# Patient Record
Sex: FEMALE | Race: WHITE | NOT HISPANIC OR LATINO | Employment: FULL TIME | URBAN - METROPOLITAN AREA
[De-identification: names, ages, dates, MRNs, and addresses within clinical notes are randomized per-mention and may not be internally consistent; named-entity substitution may affect disease eponyms.]

---

## 2017-02-17 ENCOUNTER — HOSPITAL ENCOUNTER (EMERGENCY)
Facility: HOSPITAL | Age: 52
Discharge: HOME | End: 2017-02-17
Attending: EMERGENCY MEDICINE | Admitting: EMERGENCY MEDICINE
Payer: COMMERCIAL

## 2017-02-17 VITALS
RESPIRATION RATE: 18 BRPM | TEMPERATURE: 99.3 F | SYSTOLIC BLOOD PRESSURE: 138 MMHG | DIASTOLIC BLOOD PRESSURE: 72 MMHG | WEIGHT: 190.48 LBS | BODY MASS INDEX: 30.61 KG/M2 | HEIGHT: 66 IN | OXYGEN SATURATION: 96 % | HEART RATE: 113 BPM

## 2017-02-17 DIAGNOSIS — J10.1 INFLUENZA A: Primary | ICD-10-CM

## 2017-02-17 LAB
ANION GAP SERPL CALCULATED.3IONS-SCNC: 10 MMOL/L (ref 4–13)
ATRIAL RATE: 70 BPM
BACTERIA UR QL AUTO: ABNORMAL /HPF
BASOPHILS # BLD AUTO: 0.05 THOUSANDS/ΜL (ref 0–0.1)
BASOPHILS NFR BLD AUTO: 1 % (ref 0–1)
BILIRUB UR QL STRIP: NEGATIVE
BUN SERPL-MCNC: 8 MG/DL (ref 5–25)
CALCIUM SERPL-MCNC: 9.3 MG/DL (ref 8.3–10.1)
CHLORIDE SERPL-SCNC: 102 MMOL/L (ref 100–108)
CLARITY UR: ABNORMAL
CO2 SERPL-SCNC: 24 MMOL/L (ref 21–32)
COLOR UR: YELLOW
CREAT SERPL-MCNC: 1.12 MG/DL (ref 0.6–1.3)
EOSINOPHIL # BLD AUTO: 0.09 THOUSAND/ΜL (ref 0–0.61)
EOSINOPHIL NFR BLD AUTO: 1 % (ref 0–6)
ERYTHROCYTE [DISTWIDTH] IN BLOOD BY AUTOMATED COUNT: 11.7 % (ref 11.6–15.1)
FLUAV AG SPEC QL IA: POSITIVE
FLUBV AG SPEC QL IA: NEGATIVE
GFR SERPL CREATININE-BSD FRML MDRD: 51.7 ML/MIN/1.73SQ M
GLUCOSE SERPL-MCNC: 115 MG/DL (ref 65–140)
GLUCOSE UR STRIP-MCNC: NEGATIVE MG/DL
HCG UR QL: NEGATIVE
HCT VFR BLD AUTO: 41.2 % (ref 34.8–46.1)
HGB BLD-MCNC: 14 G/DL (ref 11.5–15.4)
HGB UR QL STRIP.AUTO: ABNORMAL
KETONES UR STRIP-MCNC: NEGATIVE MG/DL
LEUKOCYTE ESTERASE UR QL STRIP: NEGATIVE
LYMPHOCYTES # BLD AUTO: 0.45 THOUSANDS/ΜL (ref 0.6–4.47)
LYMPHOCYTES NFR BLD AUTO: 5 % (ref 14–44)
MCH RBC QN AUTO: 30.2 PG (ref 26.8–34.3)
MCHC RBC AUTO-ENTMCNC: 34 G/DL (ref 31.4–37.4)
MCV RBC AUTO: 89 FL (ref 82–98)
MONOCYTES # BLD AUTO: 0.61 THOUSAND/ΜL (ref 0.17–1.22)
MONOCYTES NFR BLD AUTO: 7 % (ref 4–12)
NEUTROPHILS # BLD AUTO: 8.06 THOUSANDS/ΜL (ref 1.85–7.62)
NEUTS SEG NFR BLD AUTO: 87 % (ref 43–75)
NITRITE UR QL STRIP: NEGATIVE
NON-SQ EPI CELLS URNS QL MICRO: ABNORMAL /HPF
NRBC BLD AUTO-RTO: 0 /100 WBCS
P AXIS: 43 DEGREES
PH UR STRIP.AUTO: 6 [PH] (ref 4.5–8)
PLATELET # BLD AUTO: 227 THOUSANDS/UL (ref 149–390)
PMV BLD AUTO: 9.8 FL (ref 8.9–12.7)
POTASSIUM SERPL-SCNC: 3.9 MMOL/L (ref 3.5–5.3)
PR INTERVAL: 146 MS
PROT UR STRIP-MCNC: NEGATIVE MG/DL
QRS AXIS: 30 DEGREES
QRSD INTERVAL: 76 MS
QT INTERVAL: 386 MS
QTC INTERVAL: 416 MS
RBC # BLD AUTO: 4.64 MILLION/UL (ref 3.81–5.12)
RBC #/AREA URNS AUTO: ABNORMAL /HPF
SODIUM SERPL-SCNC: 136 MMOL/L (ref 136–145)
SP GR UR STRIP.AUTO: 1.01 (ref 1–1.03)
T WAVE AXIS: 39 DEGREES
UROBILINOGEN UR QL STRIP.AUTO: 0.2 E.U./DL
VENTRICULAR RATE: 70 BPM
WBC # BLD AUTO: 9.3 THOUSAND/UL (ref 4.31–10.16)
WBC #/AREA URNS AUTO: ABNORMAL /HPF

## 2017-02-17 PROCEDURE — 81001 URINALYSIS AUTO W/SCOPE: CPT | Performed by: EMERGENCY MEDICINE

## 2017-02-17 PROCEDURE — 87086 URINE CULTURE/COLONY COUNT: CPT | Performed by: EMERGENCY MEDICINE

## 2017-02-17 PROCEDURE — 87400 INFLUENZA A/B EACH AG IA: CPT | Performed by: EMERGENCY MEDICINE

## 2017-02-17 PROCEDURE — 80048 BASIC METABOLIC PNL TOTAL CA: CPT | Performed by: EMERGENCY MEDICINE

## 2017-02-17 PROCEDURE — 99284 EMERGENCY DEPT VISIT MOD MDM: CPT

## 2017-02-17 PROCEDURE — 96360 HYDRATION IV INFUSION INIT: CPT

## 2017-02-17 PROCEDURE — 93005 ELECTROCARDIOGRAM TRACING: CPT | Performed by: EMERGENCY MEDICINE

## 2017-02-17 PROCEDURE — 85025 COMPLETE CBC W/AUTO DIFF WBC: CPT | Performed by: EMERGENCY MEDICINE

## 2017-02-17 PROCEDURE — A9270 NON-COVERED ITEM OR SERVICE: HCPCS | Performed by: EMERGENCY MEDICINE

## 2017-02-17 PROCEDURE — 36415 COLL VENOUS BLD VENIPUNCTURE: CPT | Performed by: EMERGENCY MEDICINE

## 2017-02-17 PROCEDURE — 81025 URINE PREGNANCY TEST: CPT | Performed by: EMERGENCY MEDICINE

## 2017-02-17 RX ORDER — OSELTAMIVIR PHOSPHATE 75 MG/1
75 CAPSULE ORAL EVERY 12 HOURS
Qty: 20 CAPSULE | Refills: 0 | Status: SHIPPED | OUTPATIENT
Start: 2017-02-17 | End: 2017-02-27

## 2017-02-17 RX ORDER — ACETAMINOPHEN 325 MG/1
650 TABLET ORAL ONCE
Status: COMPLETED | OUTPATIENT
Start: 2017-02-17 | End: 2017-02-17

## 2017-02-17 RX ADMIN — ACETAMINOPHEN 650 MG: 325 TABLET, FILM COATED ORAL at 11:46

## 2017-02-17 RX ADMIN — SODIUM CHLORIDE 1000 ML: 0.9 INJECTION, SOLUTION INTRAVENOUS at 11:55

## 2017-02-19 LAB
BACTERIA UR CULT: NORMAL
BACTERIA UR CULT: NORMAL

## 2021-04-06 DIAGNOSIS — Z23 ENCOUNTER FOR IMMUNIZATION: ICD-10-CM

## 2023-04-05 ENCOUNTER — APPOINTMENT (EMERGENCY)
Dept: RADIOLOGY | Facility: HOSPITAL | Age: 58
End: 2023-04-05

## 2023-04-05 ENCOUNTER — OFFICE VISIT (OUTPATIENT)
Dept: URGENT CARE | Facility: CLINIC | Age: 58
End: 2023-04-05

## 2023-04-05 ENCOUNTER — HOSPITAL ENCOUNTER (EMERGENCY)
Facility: HOSPITAL | Age: 58
Discharge: HOME | End: 2023-04-05
Attending: EMERGENCY MEDICINE

## 2023-04-05 VITALS
HEART RATE: 79 BPM | OXYGEN SATURATION: 98 % | RESPIRATION RATE: 18 BRPM | TEMPERATURE: 101.6 F | WEIGHT: 195 LBS | SYSTOLIC BLOOD PRESSURE: 78 MMHG | BODY MASS INDEX: 31.47 KG/M2 | DIASTOLIC BLOOD PRESSURE: 60 MMHG

## 2023-04-05 VITALS
DIASTOLIC BLOOD PRESSURE: 56 MMHG | SYSTOLIC BLOOD PRESSURE: 101 MMHG | HEART RATE: 66 BPM | RESPIRATION RATE: 17 BRPM | TEMPERATURE: 99.2 F | OXYGEN SATURATION: 95 %

## 2023-04-05 DIAGNOSIS — B34.9 VIRAL SYNDROME: ICD-10-CM

## 2023-04-05 DIAGNOSIS — R11.0 NAUSEA: ICD-10-CM

## 2023-04-05 DIAGNOSIS — I95.9 HYPOTENSION, UNSPECIFIED HYPOTENSION TYPE: Primary | ICD-10-CM

## 2023-04-05 DIAGNOSIS — B34.9 VIRAL SYNDROME: Primary | ICD-10-CM

## 2023-04-05 DIAGNOSIS — R55 NEAR SYNCOPE: ICD-10-CM

## 2023-04-05 LAB
ALBUMIN SERPL BCP-MCNC: 4.1 G/DL (ref 3.5–5)
ALP SERPL-CCNC: 74 U/L (ref 34–104)
ALT SERPL W P-5'-P-CCNC: 32 U/L (ref 7–52)
ANION GAP SERPL CALCULATED.3IONS-SCNC: 7 MMOL/L (ref 4–13)
AST SERPL W P-5'-P-CCNC: 37 U/L (ref 13–39)
ATRIAL RATE: 75 BPM
BASOPHILS # BLD MANUAL: 0 THOUSAND/UL (ref 0–0.1)
BASOPHILS NFR MAR MANUAL: 0 % (ref 0–1)
BILIRUB SERPL-MCNC: 0.9 MG/DL (ref 0.2–1)
BUN SERPL-MCNC: 12 MG/DL (ref 5–25)
CALCIUM SERPL-MCNC: 8.5 MG/DL (ref 8.4–10.2)
CARDIAC TROPONIN I PNL SERPL HS: 3 NG/L
CHLORIDE SERPL-SCNC: 104 MMOL/L (ref 96–108)
CO2 SERPL-SCNC: 25 MMOL/L (ref 21–32)
CREAT SERPL-MCNC: 1.08 MG/DL (ref 0.6–1.3)
EOSINOPHIL # BLD MANUAL: 0 THOUSAND/UL (ref 0–0.4)
EOSINOPHIL NFR BLD MANUAL: 0 % (ref 0–6)
ERYTHROCYTE [DISTWIDTH] IN BLOOD BY AUTOMATED COUNT: 12.2 % (ref 11.6–15.1)
FLUAV RNA RESP QL NAA+PROBE: NEGATIVE
FLUBV RNA RESP QL NAA+PROBE: NEGATIVE
GFR SERPL CREATININE-BSD FRML MDRD: 57 ML/MIN/1.73SQ M
GLUCOSE SERPL-MCNC: 124 MG/DL (ref 65–140)
GLUCOSE SERPL-MCNC: 126 MG/DL (ref 65–140)
HCT VFR BLD AUTO: 34.6 % (ref 34.8–46.1)
HGB BLD-MCNC: 11.9 G/DL (ref 11.5–15.4)
LYMPHOCYTES # BLD AUTO: 0.88 THOUSAND/UL (ref 0.6–4.47)
LYMPHOCYTES # BLD AUTO: 21 % (ref 14–44)
MAGNESIUM SERPL-MCNC: 2 MG/DL (ref 1.9–2.7)
MCH RBC QN AUTO: 30.4 PG (ref 26.8–34.3)
MCHC RBC AUTO-ENTMCNC: 34.4 G/DL (ref 31.4–37.4)
MCV RBC AUTO: 89 FL (ref 82–98)
MONOCYTES # BLD AUTO: 0.17 THOUSAND/UL (ref 0–1.22)
MONOCYTES NFR BLD: 4 % (ref 4–12)
NEUTROPHILS # BLD MANUAL: 3.1 THOUSAND/UL (ref 1.85–7.62)
NEUTS SEG NFR BLD AUTO: 74 % (ref 43–75)
P AXIS: 50 DEGREES
PLATELET # BLD AUTO: 110 THOUSANDS/UL (ref 149–390)
PLATELET BLD QL SMEAR: ABNORMAL
PMV BLD AUTO: 10.7 FL (ref 8.9–12.7)
POTASSIUM SERPL-SCNC: 4 MMOL/L (ref 3.5–5.3)
PR INTERVAL: 152 MS
PROT SERPL-MCNC: 7.1 G/DL (ref 6.4–8.4)
QRS AXIS: 29 DEGREES
QRSD INTERVAL: 76 MS
QT INTERVAL: 360 MS
QTC INTERVAL: 402 MS
RBC # BLD AUTO: 3.91 MILLION/UL (ref 3.81–5.12)
RBC MORPH BLD: NORMAL
RSV RNA RESP QL NAA+PROBE: NEGATIVE
SARS-COV-2 RNA RESP QL NAA+PROBE: NEGATIVE
SODIUM SERPL-SCNC: 136 MMOL/L (ref 135–147)
T WAVE AXIS: 53 DEGREES
VARIANT LYMPHS # BLD AUTO: 1 %
VENTRICULAR RATE: 75 BPM
WBC # BLD AUTO: 4.19 THOUSAND/UL (ref 4.31–10.16)

## 2023-04-05 RX ORDER — KETOROLAC TROMETHAMINE 30 MG/ML
15 INJECTION, SOLUTION INTRAMUSCULAR; INTRAVENOUS ONCE
Status: COMPLETED | OUTPATIENT
Start: 2023-04-05 | End: 2023-04-05

## 2023-04-05 RX ORDER — ONDANSETRON 2 MG/ML
1 INJECTION INTRAMUSCULAR; INTRAVENOUS ONCE
Status: COMPLETED | OUTPATIENT
Start: 2023-04-05 | End: 2023-04-05

## 2023-04-05 RX ORDER — ONDANSETRON 4 MG/1
4 TABLET, ORALLY DISINTEGRATING ORAL EVERY 6 HOURS PRN
Qty: 20 TABLET | Refills: 0 | Status: SHIPPED | OUTPATIENT
Start: 2023-04-05

## 2023-04-05 RX ADMIN — SODIUM CHLORIDE 1000 ML: 0.9 INJECTION, SOLUTION INTRAVENOUS at 12:11

## 2023-04-05 RX ADMIN — KETOROLAC TROMETHAMINE 15 MG: 30 INJECTION, SOLUTION INTRAMUSCULAR; INTRAVENOUS at 12:10

## 2023-04-05 NOTE — Clinical Note
Cecilia Munguia was seen and treated in our emergency department on 4/5/2023  No restrictions            Diagnosis:     Wilber Daniel  may return to work on return date  She may return on this date: 04/11/2023         If you have any questions or concerns, please don't hesitate to call        Alvin Manuel PA-C    ______________________________           _______________          _______________  Hospital Representative                              Date                                Time

## 2023-04-05 NOTE — ED PROVIDER NOTES
History  Chief Complaint   Patient presents with   • Headache     From Urgent Care; reports near syncope episode after severe dizziness  Initial symptoms started on Sunday; reports worsening nausea, dizziness, headache and chills throughout the week  51yo female with no significant past medical history presenting via EMS for evaluation of flu-like symptoms  She reports nausea, chills, body aches, and headache for the past 3 days  She decided to go to urgent care for her symptoms  While urgent care, she had a near syncopal episode and she was noted to be transiently hypotensive  There was no loss of consciousness  Her dizziness is currently resolved and she received IV Zofran prehospital and nausea is improved  Patient reports similar near syncopal episodes in the past while in healthcare settings  She denies any fevers, chest pain, shortness of breath, abdominal pain  No known sick contacts  History provided by:  Patient and medical records   used: No    Flu Symptoms  Presenting symptoms: fatigue, headache and myalgias    Presenting symptoms: no fever, no nausea, no shortness of breath and no vomiting    Severity:  Moderate  Onset quality:  Gradual  Duration:  3 days  Progression:  Worsening  Chronicity:  New  Relieved by:  Nothing  Worsened by:  Nothing  Associated symptoms: chills    Associated symptoms: no mental status change, no neck stiffness and no syncope    Risk factors: no immunocompromised state and no sick contacts        None       No past medical history on file  No past surgical history on file  No family history on file  I have reviewed and agree with the history as documented  E-Cigarette/Vaping     E-Cigarette/Vaping Substances     Social History     Tobacco Use   • Smoking status: Never   Substance Use Topics   • Alcohol use: Yes     Comment: occational    • Drug use: No       Review of Systems   Constitutional: Positive for chills and fatigue  Negative for fever  HENT: Negative for drooling and voice change  Eyes: Negative for discharge and redness  Respiratory: Negative for shortness of breath and stridor  Cardiovascular: Negative for chest pain and leg swelling  Gastrointestinal: Negative for nausea and vomiting  Musculoskeletal: Positive for myalgias  Negative for neck pain and neck stiffness  Skin: Negative for color change and rash  Neurological: Positive for dizziness, light-headedness and headaches  Negative for seizures and syncope  Psychiatric/Behavioral: Negative for confusion  The patient is not nervous/anxious  All other systems reviewed and are negative  Physical Exam  Physical Exam  Vitals and nursing note reviewed  Constitutional:       General: She is not in acute distress  Appearance: She is well-developed  She is not diaphoretic  HENT:      Head: Normocephalic and atraumatic  Right Ear: External ear normal       Left Ear: External ear normal       Nose: Nose normal    Eyes:      General: No scleral icterus  Right eye: No discharge  Left eye: No discharge  Conjunctiva/sclera: Conjunctivae normal    Cardiovascular:      Rate and Rhythm: Normal rate and regular rhythm  Heart sounds: Normal heart sounds  No murmur heard  Pulmonary:      Effort: Pulmonary effort is normal  No respiratory distress  Breath sounds: Normal breath sounds  No stridor  No wheezing or rales  Abdominal:      General: Bowel sounds are normal  There is no distension  Palpations: Abdomen is soft  Tenderness: There is no abdominal tenderness  There is no guarding  Musculoskeletal:         General: No deformity  Normal range of motion  Cervical back: Normal range of motion and neck supple  Lymphadenopathy:      Cervical: No cervical adenopathy  Skin:     General: Skin is warm and dry  Neurological:      Mental Status: She is alert  She is not disoriented        GCS: GCS eye subscore is 4  GCS verbal subscore is 5  GCS motor subscore is 6  Psychiatric:         Behavior: Behavior normal          Vital Signs  ED Triage Vitals [04/05/23 1135]   Temperature Pulse Respirations Blood Pressure SpO2   99 2 °F (37 3 °C) 73 20 116/58 98 %      Temp src Heart Rate Source Patient Position - Orthostatic VS BP Location FiO2 (%)   -- -- -- -- --      Pain Score       8           Vitals:    04/05/23 1135 04/05/23 1200 04/05/23 1300 04/05/23 1400   BP: 116/58 109/58 106/62 101/56   Pulse: 73 77 69 66         Visual Acuity      ED Medications  Medications   ondansetron (FOR EMS ONLY) (ZOFRAN) 4 mg/2 mL injection 4 mg (0 mg Does not apply Given to EMS 4/5/23 1144)   sodium chloride 0 9 % bolus 1,000 mL (0 mL Intravenous Stopped 4/5/23 1400)   ketorolac (TORADOL) injection 15 mg (15 mg Intravenous Given 4/5/23 1210)       Diagnostic Studies  Results Reviewed     Procedure Component Value Units Date/Time    FLU/RSV/COVID - if FLU/RSV clinically relevant [[id number]]  (Normal) Collected: 04/05/23 1210    Lab Status: Final result Specimen: Nares from Nose Updated: 04/05/23 1411     SARS-CoV-2 Negative     INFLUENZA A PCR Negative     INFLUENZA B PCR Negative     RSV PCR Negative    Narrative:      FOR PEDIATRIC PATIENTS - copy/paste COVID Guidelines URL to browser: [email or web address]    SARS-CoV-2 assay is a Nucleic Acid Amplification assay intended for the  qualitative detection of nucleic acid from SARS-CoV-2 in nasopharyngeal  swabs  Results are for the presumptive identification of SARS-CoV-2 RNA  Positive results are indicative of infection with SARS-CoV-2, the virus  causing COVID-19, but do not rule out bacterial infection or co-infection  with other viruses  Laboratories within the [place] and its  territories are required to report all positive results to the appropriate  public health authorities   Negative results do not preclude SARS-CoV-2  infection and should not be used as the sole basis for treatment or other  patient management decisions  Negative results must be combined with  clinical observations, patient history, and epidemiological information  This test has not been FDA cleared or approved  This test has been authorized by FDA under an Emergency Use Authorization  (EUA)  This test is only authorized for the duration of time the  declaration that circumstances exist justifying the authorization of the  emergency use of an in vitro diagnostic tests for detection of SARS-CoV-2  virus and/or diagnosis of COVID-19 infection under section 564(b)(1) of  the Act, 21 U  S C  083QXR-9(X)(5), unless the authorization is terminated  or revoked sooner  The test has been validated but independent review by FDA  and CLIA is pending  Test performed using [employer] GeneXpert: This RT-PCR assay targets N2,  a region unique to SARS-CoV-2  A conserved region in the E-gene was chosen  for pan-Sarbecovirus detection which includes SARS-CoV-2  According to CMS-2020-01-R, this platform meets the definition of high-[employer] technology      Manual Differential(PHLEBS Do Not Order) [[id number]]  (Abnormal) Collected: 04/05/23 1210    Lab Status: Final result Specimen: Blood from Arm, Left Updated: 04/05/23 1325     Segmented % 74 %      Lymphocytes % 21 %      Monocytes % 4 %      Eosinophils, % 0 %      Basophils % 0 %      Atypical Lymphocytes % 1 %      Absolute Neutrophils 3 10 Thousand/uL      Lymphocytes Absolute 0 88 Thousand/uL      Monocytes Absolute 0 17 Thousand/uL      Eosinophils Absolute 0 00 Thousand/uL      Basophils Absolute 0 00 Thousand/uL      Total Counted --     RBC Morphology Normal     Platelet Estimate Borderline    HS Troponin 0hr (reflex protocol) [[id number]]  (Normal) Collected: 04/05/23 1210    Lab Status: Final result Specimen: Blood from Arm, Left Updated: 04/05/23 1256     hs TnI 0hr 3 ng/L     Comprehensive metabolic panel [[id number]] Collected: 04/05/23 1210    Lab Status: Final result Specimen: Blood from Arm, Left Updated: 04/05/23 1248     Sodium 136 mmol/L      Potassium 4 0 mmol/L      Chloride 104 mmol/L      CO2 25 mmol/L      ANION GAP 7 mmol/L      BUN 12 mg/dL      Creatinine 1 08 mg/dL      Glucose 126 mg/dL      Calcium 8 5 mg/dL      AST 37 U/L      ALT 32 U/L      Alkaline Phosphatase 74 U/L      Total Protein 7 1 g/dL      Albumin 4 1 g/dL      Total Bilirubin 0 90 mg/dL      eGFR 57 ml/min/1 73sq m     Narrative:      [place] guidelines for Chronic Kidney Disease (CKD):   •  Stage 1 with normal or high GFR (GFR > 90 mL/min/1 73 square meters)  •  Stage 2 Mild CKD (GFR = 60-89 mL/min/1 73 square meters)  •  Stage 3A Moderate CKD (GFR = 45-59 mL/min/1 73 square meters)  •  Stage 3B Moderate CKD (GFR = 30-44 mL/min/1 73 square meters)  •  Stage 4 Severe CKD (GFR = 15-29 mL/min/1 73 square meters)  •  Stage 5 End Stage CKD (GFR <15 mL/min/1 73 square meters)  Note: GFR calculation is accurate only with a steady state creatinine    Magnesium [[id number]]  (Normal) Collected: 04/05/23 1210    Lab Status: Final result Specimen: Blood from Arm, Left Updated: 04/05/23 1248     Magnesium 2 0 mg/dL     CBC and differential [[id number]]  (Abnormal) Collected: 04/05/23 1210    Lab Status: Final result Specimen: Blood from Arm, Left Updated: 04/05/23 1228     WBC 4 19 Thousand/uL      RBC 3 91 Million/uL      Hemoglobin 11 9 g/dL      Hematocrit 34 6 %      MCV 89 fL      MCH 30 4 pg      MCHC 34 4 g/dL      RDW 12 2 %      MPV 10 7 fL      Platelets 494 Thousands/uL                  XR chest 1 view portable   ED Interpretation by Dannielle Haines PA-C (04/05 9633)   No acute abnormality      Final Result by Mona Luna MD (04/05 5645)      No acute cardiopulmonary disease                    Workstation performed: [id number]                    Procedures  ECG 12 Lead Documentation Only    Date/Time: 4/5/2023 3:15 PM  Performed by: Leonel Falcon PA-C  Authorized by: Leonel Falcon PA-C     Indications / Diagnosis:  Near syncope  ECG reviewed by me, the ED Provider: yes    Patient location:  ED  Rate:     ECG rate:  75    ECG rate assessment: normal    Rhythm:     Rhythm: sinus rhythm    Ectopy:     Ectopy: none    QRS:     QRS axis:  Normal  Conduction:     Conduction: normal    ST segments:     ST segments:  Normal  T waves:     T waves: normal               ED Course                 SBIRT 20yo+    Flowsheet Row Most Recent Value   SBIRT (23 yo +)    In order to provide better care to our patients, we are screening all of our patients for alcohol and drug use  Would it be okay to ask you these screening questions? Yes Filed at: 04/05/2023 1137   Initial Alcohol Screen: US AUDIT-C     1  How often do you have a drink containing alcohol? 0 Filed at: 04/05/2023 1137   2  How many drinks containing alcohol do you have on a typical day you are drinking? 0 Filed at: 04/05/2023 1137   3a  Male UNDER 65: How often do you have five or more drinks on one occasion? 0 Filed at: 04/05/2023 1137   3b  FEMALE Any Age, or MALE 65+: How often do you have 4 or more drinks on one occassion? 0 Filed at: 04/05/2023 1137   Audit-C Score 0 Filed at: 04/05/2023 1137   ZENOBIA: How many times in the past year have you    Used an illegal drug or used a prescription medication for non-medical reasons? Never Filed at: 04/05/2023 1137                    Medical Decision Making  49yoF presenting for flu-like symptoms x 3 days  Had a near syncopal episode and transient hypotension at urgent care so sent to the ED  Now feeling improved  No CP/SOB  She is afebrile and hemodynamically stable  Blood pressure 116/50 on arrival   She is well-appearing in no acute distress  Exam is reassuring  Initial ED plan: Check cardiac labs, magnesium, EKG, COVID/flu swab, and CXR   IV Toradol and fluid bolus for symptoms  Final assessment: Labs reveal a mild leukopenia with a WBC 4 19 and thrombocytopenia with a platelet count 073  Suspect this is 2/2 viral illness  Remainder of labs unremarkable including normal electrolytes, renal function, LFTs  EKG reveals NSR without ischemic changes and troponin is normal  COVID/flu negative  CXR is clear  Patient is feeling improved on reevaluation  No episodes of hypotension during ED stay  She is stable for discharge  Supportive care discussed and prescription provided for Zofran  Advised close PCP follow-up  ED return precautions discussed  Patient expressed understanding and is agreeable to plan  Patient discharged in stable condition  Nausea: acute illness or injury  Near syncope: acute illness or injury  Viral syndrome: acute illness or injury  Amount and/or Complexity of Data Reviewed  Labs: ordered  Radiology: ordered and independent interpretation performed  ECG/medicine tests: ordered and independent interpretation performed  Risk  Prescription drug management  Disposition  Final diagnoses:   Viral syndrome   Near syncope   Nausea     Time reflects when diagnosis was documented in both MDM as applicable and the Disposition within this note     Time User Action Codes Description Comment    4/5/2023  2:16  [employer] [B34 9] Viral syndrome     4/5/2023  2:16  gumiwy, [employer]ia [employer]ah [R55] Near syncope     4/5/2023  2:17  g[employer]Adjudicaorah [R11 0] Nausea       ED Disposition     ED Disposition   Discharge    Condition   Stable    Date/Time   Wed Apr 5, 2023  2:16 PM    Comment   Leonora Murillo discharge to home/self care                 Follow-up Information     Follow up With Specialties Details Why Contact Info Additional [address], DO Family Medicine Schedule an appointment as soon as possible for a visit   [address] [facility] [address] [phone]       [facility] Emergency Medicine  If symptoms worsen [address] Emergency Department, [address] [place] [address], [place], [place], [address]          Discharge Medication List as of 4/5/2023  2:18 PM      START taking these medications    Details   ondansetron (Zofran ODT) 4 mg disintegrating tablet Take 1 tablet (4 mg total) by mouth every 6 (six) hours as needed for nausea or vomiting, Starting Wed 4/5/2023, Normal             No discharge procedures on file      PDMP Review     None          ED Provider  Electronically Signed by           Radha Ybarra PA-C  04/06/23 5830

## 2023-04-05 NOTE — PROGRESS NOTES
[facility]        NAME: Ana Paula Damon is a 56 y o  female  : 1965    MRN: [id number]  DATE: 2023  TIME: 10:36 AM    Assessment and Plan   Hypotension, unspecified hypotension type [I95 9]  1  Hypotension, unspecified hypotension type  Transfer to other facility      2  Viral syndrome  CANCELED: Covid/Flu-Office Collect        Symptoms suggestive of viral illness  RN reported BP was 73/60 and when I went into exam room patient had near syncopal episode Smelling salts were given and EMS was called     Patient Instructions       Follow up with PCP in 3-5 days  Proceed to  ER if symptoms worsen  Chief Complaint     Chief Complaint   Patient presents with   • Cold Like Symptoms     Pt c/o chills, migraine, nauseas, and body aches since Saturday  Pt vomited 1 time yesterday  States had a stressful weekend and doesn't think ate/drank enough  Also stated last time felt this way she was having reaction to sulfa, but hasn't been around any she thinks  History of Present Illness       Patient is a 55 yo female who presents for evaluation of chills, body aches, headache, nausea x 5 days  She reports one episode of vomiting yesterday  She denies any abdominal pain, CP, SOB  Review of Systems   Review of Systems   Constitutional: Positive for chills  Negative for fever  Gastrointestinal: Positive for nausea and vomiting  Negative for abdominal pain and diarrhea  Musculoskeletal: Positive for arthralgias and myalgias  Neurological: Positive for dizziness and headaches  Current Medications     No current outpatient medications on file      Current Allergies     Allergies as of 2023 - Reviewed 2023   Allergen Reaction Noted   • Sulfa antibiotics Other (See Comments) 2017            The following portions of the patient's history were reviewed and updated as appropriate: allergies, current medications, past family history, past medical history, past social history, past surgical history and problem list      History reviewed  No pertinent past medical history  History reviewed  No pertinent surgical history  History reviewed  No pertinent family history  Medications have been verified  Objective   BP (!) 73/60   Pulse 79   Temp (!) 101 6 °F (38 7 °C)   Resp 18   Wt 88 5 kg (195 lb)   SpO2 98%   BMI 31 47 kg/m²        Physical Exam     Physical Exam  Constitutional:       General: She is not in acute distress  Appearance: Normal appearance  She is not ill-appearing or diaphoretic  HENT:      Right Ear: Tympanic membrane, ear canal and external ear normal       Left Ear: Tympanic membrane, ear canal and external ear normal       Nose: Nose normal       Mouth/Throat:      Mouth: Mucous membranes are moist       Pharynx: Oropharynx is clear  Eyes:      Conjunctiva/sclera: Conjunctivae normal    Cardiovascular:      Rate and Rhythm: Normal rate and regular rhythm  Heart sounds: Normal heart sounds  Pulmonary:      Effort: Pulmonary effort is normal       Breath sounds: Normal breath sounds  No wheezing, rhonchi or rales  Skin:     General: Skin is warm and dry  Neurological:      Mental Status: She is alert     Psychiatric:         Mood and Affect: Mood normal          Behavior: Behavior normal

## 2023-04-05 NOTE — DISCHARGE INSTRUCTIONS
Take Zofran as needed for nausea  Drink plenty of fluids and rest     Please follow-up with your family doctor  You should have repeat blood work in 2-3 weeks to recheck your blood counts  Return to the ER with any worsening symptoms

## 2023-04-13 ENCOUNTER — APPOINTMENT (OUTPATIENT)
Dept: VASCULAR ULTRASOUND | Facility: HOSPITAL | Age: 58
End: 2023-04-13

## 2023-04-13 ENCOUNTER — APPOINTMENT (EMERGENCY)
Dept: RADIOLOGY | Facility: HOSPITAL | Age: 58
End: 2023-04-13

## 2023-04-13 ENCOUNTER — HOSPITAL ENCOUNTER (INPATIENT)
Facility: HOSPITAL | Age: 58
LOS: 4 days | Discharge: HOME | End: 2023-04-18
Attending: EMERGENCY MEDICINE | Admitting: INTERNAL MEDICINE

## 2023-04-13 ENCOUNTER — APPOINTMENT (EMERGENCY)
Dept: CT IMAGING | Facility: HOSPITAL | Age: 58
End: 2023-04-13

## 2023-04-13 DIAGNOSIS — R50.9 FEVER: Primary | ICD-10-CM

## 2023-04-13 DIAGNOSIS — R74.01 TRANSAMINITIS: ICD-10-CM

## 2023-04-13 DIAGNOSIS — D72.820 LYMPHOCYTOSIS: ICD-10-CM

## 2023-04-13 DIAGNOSIS — D64.9 ANEMIA: ICD-10-CM

## 2023-04-13 PROBLEM — J30.89 OTHER ALLERGIC RHINITIS: Status: ACTIVE | Noted: 2021-12-17

## 2023-04-13 PROBLEM — H52.223 REGULAR ASTIGMATISM OF BOTH EYES: Status: ACTIVE | Noted: 2020-12-21

## 2023-04-13 LAB
ALBUMIN SERPL BCP-MCNC: 3.6 G/DL (ref 3.5–5)
ALP SERPL-CCNC: 123 U/L (ref 34–104)
ALT SERPL W P-5'-P-CCNC: 81 U/L (ref 7–52)
ANION GAP SERPL CALCULATED.3IONS-SCNC: 9 MMOL/L (ref 4–13)
APTT PPP: 43 SECONDS (ref 23–37)
AST SERPL W P-5'-P-CCNC: 82 U/L (ref 13–39)
B BURGDOR IGG+IGM SER-ACNC: <0.2 AI
BACTERIA UR QL AUTO: ABNORMAL /HPF
BASOPHILS # BLD MANUAL: 0 THOUSAND/UL (ref 0–0.1)
BASOPHILS NFR MAR MANUAL: 0 % (ref 0–1)
BILIRUB SERPL-MCNC: 0.96 MG/DL (ref 0.2–1)
BILIRUB UR QL STRIP: NEGATIVE
BUN SERPL-MCNC: 10 MG/DL (ref 5–25)
CALCIUM SERPL-MCNC: 9 MG/DL (ref 8.4–10.2)
CHLORIDE SERPL-SCNC: 101 MMOL/L (ref 96–108)
CLARITY UR: CLEAR
CO2 SERPL-SCNC: 23 MMOL/L (ref 21–32)
COLOR UR: YELLOW
CREAT SERPL-MCNC: 1.04 MG/DL (ref 0.6–1.3)
EOSINOPHIL # BLD MANUAL: 0 THOUSAND/UL (ref 0–0.4)
EOSINOPHIL NFR BLD MANUAL: 0 % (ref 0–6)
ERYTHROCYTE [DISTWIDTH] IN BLOOD BY AUTOMATED COUNT: 13.2 % (ref 11.6–15.1)
FLUAV RNA RESP QL NAA+PROBE: NEGATIVE
FLUBV RNA RESP QL NAA+PROBE: NEGATIVE
GFR SERPL CREATININE-BSD FRML MDRD: 60 ML/MIN/1.73SQ M
GLUCOSE SERPL-MCNC: 159 MG/DL (ref 65–140)
GLUCOSE UR STRIP-MCNC: NEGATIVE MG/DL
HCT VFR BLD AUTO: 35.7 % (ref 34.8–46.1)
HGB BLD-MCNC: 12.2 G/DL (ref 11.5–15.4)
HGB UR QL STRIP.AUTO: NEGATIVE
INR PPP: 1.15 (ref 0.84–1.19)
KETONES UR STRIP-MCNC: NEGATIVE MG/DL
LACTATE SERPL-SCNC: 1.4 MMOL/L (ref 0.5–2)
LACTATE SERPL-SCNC: 2.8 MMOL/L (ref 0.5–2)
LEUKOCYTE ESTERASE UR QL STRIP: ABNORMAL
LYMPHOCYTES # BLD AUTO: 4.6 THOUSAND/UL (ref 0.6–4.47)
LYMPHOCYTES # BLD AUTO: 45 % (ref 14–44)
MCH RBC QN AUTO: 29.5 PG (ref 26.8–34.3)
MCHC RBC AUTO-ENTMCNC: 34.2 G/DL (ref 31.4–37.4)
MCV RBC AUTO: 86 FL (ref 82–98)
MONOCYTES # BLD AUTO: 1.33 THOUSAND/UL (ref 0–1.22)
MONOCYTES NFR BLD: 13 % (ref 4–12)
MUCOUS THREADS UR QL AUTO: ABNORMAL
NEUTROPHILS # BLD MANUAL: 4.3 THOUSAND/UL (ref 1.85–7.62)
NEUTS BAND NFR BLD MANUAL: 1 % (ref 0–8)
NEUTS SEG NFR BLD AUTO: 41 % (ref 43–75)
NITRITE UR QL STRIP: NEGATIVE
NON-SQ EPI CELLS URNS QL MICRO: ABNORMAL /HPF
PH UR STRIP.AUTO: 5.5 [PH]
PLATELET # BLD AUTO: 261 THOUSANDS/UL (ref 149–390)
PLATELET # BLD AUTO: 263 THOUSANDS/UL (ref 149–390)
PLATELET BLD QL SMEAR: ADEQUATE
PMV BLD AUTO: 10.1 FL (ref 8.9–12.7)
PMV BLD AUTO: 10.3 FL (ref 8.9–12.7)
POLYCHROMASIA BLD QL SMEAR: PRESENT
POTASSIUM SERPL-SCNC: 3.5 MMOL/L (ref 3.5–5.3)
PROCALCITONIN SERPL-MCNC: 0.78 NG/ML
PROT SERPL-MCNC: 7.7 G/DL (ref 6.4–8.4)
PROT UR STRIP-MCNC: ABNORMAL MG/DL
PROTHROMBIN TIME: 14.5 SECONDS (ref 11.6–14.5)
RBC # BLD AUTO: 4.14 MILLION/UL (ref 3.81–5.12)
RBC #/AREA URNS AUTO: ABNORMAL /HPF
RSV RNA RESP QL NAA+PROBE: NEGATIVE
SARS-COV-2 RNA RESP QL NAA+PROBE: NEGATIVE
SODIUM SERPL-SCNC: 133 MMOL/L (ref 135–147)
SP GR UR STRIP.AUTO: 1.03 (ref 1–1.03)
UROBILINOGEN UR STRIP-ACNC: <2 MG/DL
WBC # BLD AUTO: 10.23 THOUSAND/UL (ref 4.31–10.16)
WBC #/AREA URNS AUTO: ABNORMAL /HPF

## 2023-04-13 RX ORDER — CIPROFLOXACIN 500 MG/1
500 TABLET, FILM COATED ORAL 2 TIMES DAILY
COMMUNITY
Start: 2023-04-10 | End: 2023-04-18

## 2023-04-13 RX ORDER — DOXYCYCLINE HYCLATE 100 MG/1
100 CAPSULE ORAL EVERY 12 HOURS SCHEDULED
Status: DISCONTINUED | OUTPATIENT
Start: 2023-04-13 | End: 2023-04-14

## 2023-04-13 RX ORDER — ATOVAQUONE AND PROGUANIL HYDROCHLORIDE 250; 100 MG/1; MG/1
4 TABLET, FILM COATED ORAL
Status: DISCONTINUED | OUTPATIENT
Start: 2023-04-14 | End: 2023-04-13

## 2023-04-13 RX ORDER — ENOXAPARIN SODIUM 100 MG/ML
40 INJECTION SUBCUTANEOUS DAILY
Status: DISCONTINUED | OUTPATIENT
Start: 2023-04-14 | End: 2023-04-18 | Stop reason: HOSPADM

## 2023-04-13 RX ORDER — SODIUM CHLORIDE 9 MG/ML
100 INJECTION, SOLUTION INTRAVENOUS CONTINUOUS
Status: DISCONTINUED | OUTPATIENT
Start: 2023-04-13 | End: 2023-04-15

## 2023-04-13 RX ORDER — ATOVAQUONE 750 MG/5ML
750 SUSPENSION ORAL EVERY 12 HOURS
Status: DISCONTINUED | OUTPATIENT
Start: 2023-04-13 | End: 2023-04-16

## 2023-04-13 RX ORDER — ACETAMINOPHEN 325 MG/1
650 TABLET ORAL EVERY 6 HOURS PRN
Status: COMPLETED | OUTPATIENT
Start: 2023-04-13 | End: 2023-04-14

## 2023-04-13 RX ORDER — AZITHROMYCIN 500 MG/1
500 TABLET, FILM COATED ORAL EVERY 24 HOURS
Status: DISCONTINUED | OUTPATIENT
Start: 2023-04-14 | End: 2023-04-14

## 2023-04-13 RX ORDER — ONDANSETRON 2 MG/ML
4 INJECTION INTRAMUSCULAR; INTRAVENOUS EVERY 6 HOURS PRN
Status: DISCONTINUED | OUTPATIENT
Start: 2023-04-13 | End: 2023-04-18 | Stop reason: HOSPADM

## 2023-04-13 RX ADMIN — ACETAMINOPHEN 650 MG: 325 TABLET ORAL at 21:13

## 2023-04-13 RX ADMIN — AZITHROMYCIN 500 MG: 500 INJECTION, POWDER, LYOPHILIZED, FOR SOLUTION INTRAVENOUS at 17:14

## 2023-04-13 RX ADMIN — SODIUM CHLORIDE 100 ML/HR: 0.9 INJECTION, SOLUTION INTRAVENOUS at 17:16

## 2023-04-13 RX ADMIN — IOHEXOL 100 ML: 350 INJECTION, SOLUTION INTRAVENOUS at 12:03

## 2023-04-13 RX ADMIN — DOXYCYCLINE 100 MG: 100 CAPSULE ORAL at 21:14

## 2023-04-13 RX ADMIN — ATOVAQUONE 750 MG: 750 SUSPENSION ORAL at 18:07

## 2023-04-13 RX ADMIN — SODIUM CHLORIDE 1000 ML: 0.9 INJECTION, SOLUTION INTRAVENOUS at 11:16

## 2023-04-13 NOTE — ASSESSMENT & PLAN NOTE
· Presented with intermittent fever up to 104 °F at home for the past 10 days  · Was given Keflex a week ago and transition to Cipro few days ago for suspected UTI patient without resolution of fever and outpatient urine culture was negative  · Patient reports generalized body ache, headaches and bilateral lower extremities cramps    Denies any other symptoms  · Chest x-ray unremarkable  · CT abdomen/pelvis negative for acute pathology  · ED spoke with infectious disease who recommended tickborne illness work-up, EBV and CMV and empirically treat for tickborne illness and observe   · Follow-up EBV, CMV blood test  · Follow-up tickborne illness blood work including Lyme, Babesia, Anaplasma and Ehrlichia (patient did have leukopenia and thrombocytopenia a week ago)  · Continue with doxycycline, azithromycin and atovaquone  · Monitor fever curves and WBC counts  · Follow-up urine culture and blood cultures  · Check lower extremity ultrasound as patient does have right calf tenderness

## 2023-04-13 NOTE — H&P
[address] [place] [address]  H&P  Name: Noé Fernandes 56 y o  female I MRN: [id number]  Unit/Bed#: ED 25 I Date of Admission: 4/13/2023   Date of Service: 4/13/2023 I Hospital Day: 0      Assessment/Plan   * Fever  Assessment & Plan  · Presented with intermittent fever up to 104 °F at home for the past 10 days  · Was given Keflex a week ago and transition to Cipro few days ago for suspected UTI patient without resolution of fever and outpatient urine culture was negative  · Patient reports generalized body ache, headaches and bilateral lower extremities cramps  Denies any other symptoms  · Chest x-ray unremarkable  · CT abdomen/pelvis negative for acute pathology  · ED spoke with infectious disease who recommended tickborne illness work-up, EBV and CMV and empirically treat for tickborne illness and observe   · Follow-up EBV, CMV blood test  · Follow-up tickborne illness blood work including Lyme, Babesia, Anaplasma and Ehrlichia (patient did have leukopenia and thrombocytopenia a week ago)  · Continue with doxycycline, azithromycin and atovaquone  · Monitor fever curves and WBC counts  · Follow-up urine culture and blood cultures  · Check lower extremity ultrasound as patient does have right calf tenderness    Transaminitis  Assessment & Plan  · Mildly elevated AST and ALT  · Likely secondary to viral or parasite process  · Monitor liver enzymes         VTE Prophylaxis: Enoxaparin (Lovenox)  / sequential compression device   Code Status: full code   POLST: POLST form is not discussed and not completed at this time  Anticipated Length of Stay:  Patient will be admitted on an Observation basis with an anticipated length of stay of less than 2 midnights  Justification for Hospital Stay: Fever requiring work-up    Total Time for Visit, including Counseling / Coordination of Care: 70 minutes  Greater than 50% of this total time spent on direct patient counseling and coordination of care      Chief Complaint:   Fever    History of Present Illness:    Fiona Mcfarlane is a 56 y o  female without significant medical history who presents with fever for the past 10 days  Fever has been intermittent and up to 104 °F   Patient reports body aches, lower extremity cramps, headaches  Denies respiratory, GI symptoms, denies rash, itching or any skin lesions  She was evaluated about a week ago for her fever and was empirically treated with Keflex for UTI without improvement and then was started on Cipro few days ago for UTI possibility as well without any improvement and outpatient urine culture was negative for growth  Patient denies exposure to tick however she did go to a grassy area prior to her presentation and also has pets and deer's and her house backyard  Work-up in ED showed white count of 10 23, AST 82, ALT 81 and alk phos 123 with Pro-Nhan of 0 78  Chest x-ray unremarkable other than a possible small area of atelectasis  CT abdomen/pelvis negative for acute pathology  Of note patient had white count of 4 1 and platelets of 271 a week ago  Review of Systems:    Review of Systems   Constitutional: Positive for appetite change and fever  Negative for chills  HENT: Negative for ear pain and sore throat  Eyes: Negative for pain and visual disturbance  Respiratory: Negative for cough and shortness of breath  Cardiovascular: Negative for chest pain and palpitations  Gastrointestinal: Negative for abdominal pain and vomiting  Genitourinary: Negative for dysuria and hematuria  Musculoskeletal: Negative for arthralgias and back pain  Skin: Negative for color change and rash  Neurological: Positive for headaches  Negative for seizures and syncope  All other systems reviewed and are negative  Past Medical and Surgical History:     History reviewed  No pertinent past medical history  History reviewed  No pertinent surgical history      Meds/Allergies:    Prior to Admission medications    Medication Sig Start Date End Date Taking? Authorizing Provider   ciprofloxacin (CIPRO) 500 mg tablet Take 500 mg by mouth 2 (two) times a day 4/10/23 4/20/23 Yes Historical Provider, MD   cephalexin (KEFLEX) 500 mg capsule Take 1 capsule (500 mg total) by mouth every 12 (twelve) hours for 7 days 4/6/23 4/13/23  Shahnaz Oneil DO   ondansetron (Zofran ODT) 4 mg disintegrating tablet Take 1 tablet (4 mg total) by mouth every 6 (six) hours as needed for nausea or vomiting  Patient not taking: Reported on 4/13/2023 4/5/23   [address] [place]k[address] da[place]     I have reviewed home medications with patient personally  Allergies: Allergies   Allergen Reactions   • Sulfa Antibiotics Other (See Comments)     Drops WBC        Social History:     Marital Status:    Occupation: Teacher    Substance Use History:   Social History     Substance and Sexual Activity   Alcohol Use Yes    Comment: occational      Social History     Tobacco Use   Smoking Status Never   Smokeless Tobacco Not on file     Social History     Substance and Sexual Activity   Drug Use No       Family History:    History reviewed  No pertinent family history  Physical Exam:     Vitals:   Blood Pressure: 102/52 (04/13/23 1530)  Pulse: 90 (04/13/23 1530)  Temperature: 99 7 °F (37 6 °C) (04/13/23 1530)  Temp Source: Oral (04/13/23 1530)  Respirations: 18 (04/13/23 1530)  SpO2: 94 % (04/13/23 1530)    Physical Exam  Vitals and nursing note reviewed  Constitutional:       General: She is not in acute distress  Appearance: She is well-developed  She is not ill-appearing or diaphoretic  HENT:      Head: Normocephalic and atraumatic  Mouth/Throat:      Mouth: Mucous membranes are moist       Pharynx: Oropharynx is clear  Eyes:      General: No scleral icterus  Extraocular Movements: Extraocular movements intact        Conjunctiva/sclera: Conjunctivae normal    Cardiovascular:      Rate and Rhythm: Normal rate and regular rhythm  Heart sounds: No murmur heard  Pulmonary:      Effort: Pulmonary effort is normal  No respiratory distress  Breath sounds: Normal breath sounds  No wheezing or rales  Abdominal:      General: Bowel sounds are normal       Palpations: Abdomen is soft  Tenderness: There is no abdominal tenderness  Musculoskeletal:         General: Tenderness (Right calf) present  No swelling  Cervical back: Normal range of motion and neck supple  Right lower leg: No edema  Left lower leg: No edema  Skin:     General: Skin is warm and dry  Capillary Refill: Capillary refill takes less than 2 seconds  Coloration: Skin is not jaundiced  Findings: No erythema, lesion or rash  Neurological:      General: No focal deficit present  Mental Status: She is alert and oriented to person, place, and time  Psychiatric:         Mood and Affect: Mood normal          Behavior: Behavior normal          Additional Data:     Lab Results: I have personally reviewed pertinent reports  Results from last 7 days   Lab Units 04/13/23  1113   WBC Thousand/uL 10 23*   HEMOGLOBIN g/dL 12 2   HEMATOCRIT % 35 7   PLATELETS Thousands/uL 263   BANDS PCT % 1   LYMPHO PCT % 45*   MONO PCT % 13*   EOS PCT % 0     Results from last 7 days   Lab Units 04/13/23  1113   SODIUM mmol/L 133*   POTASSIUM mmol/L 3 5   CHLORIDE mmol/L 101   CO2 mmol/L 23   BUN mg/dL 10   CREATININE mg/dL 1 04   ANION GAP mmol/L 9   CALCIUM mg/dL 9 0   ALBUMIN g/dL 3 6   TOTAL BILIRUBIN mg/dL 0 96   ALK PHOS U/L 123*   ALT U/L 81*   AST U/L 82*   GLUCOSE RANDOM mg/dL 159*     Results from last 7 days   Lab Units 04/13/23  1113   INR  1 15             Results from last 7 days   Lab Units 04/13/23  1337 04/13/23  1113   LACTIC ACID mmol/L 1 4 2 8*   PROCALCITONIN ng/ml  --  0 78*       Imaging: I have personally reviewed pertinent reports        CT abdomen pelvis with contrast   Final Result by Dena Cook Nidhi Antony MD (04/13 132)      No evidence of acute abdominopelvic process  Mild splenomegaly  Minimal sigmoid diverticulosis  Workstation performed: [id number]         XR chest pa & lateral   Final Result by Darian Lopez MD (04/13 1120)      Subtle opacity in the medial left base on the frontal projection  While this could be due to atelectasis, pneumonia is not excluded in the appropriate clinical setting  The study was marked in [facility] for immediate notification  Workstation performed: [id number]         VAS lower limb venous duplex study, unilateral/limited    (Results Pending)       EKG, Pathology, and Other Studies Reviewed on Admission:       Allscripts / Epic Records Reviewed: Yes     ** Please Note: This note has been constructed using a voice recognition system   **

## 2023-04-13 NOTE — ASSESSMENT & PLAN NOTE
· Mildly elevated AST and ALT  · Likely secondary to viral or parasite process  · Monitor liver enzymes

## 2023-04-13 NOTE — LETTER
[address] 3RD FLOOR MED SURG UNIT  [address] [address]  Dept: [phone]    April 18, 2023     Patient: Fiona Mcfarlane   YOB: 1965   Date of Visit: 4/13/2023       To Whom it May Concern:    Fiona Mcfarlane is under my professional care  She was seen in the hospital from 4/13/2023 to 04/18/23  She is to remain out of work until seen and cleared by her primary care provider       If you have any questions or concerns, please don't hesitate to call           Sincerely,          Ryan Marie

## 2023-04-13 NOTE — ED PROVIDER NOTES
History  Chief Complaint   Patient presents with   • Fever - 9 weeks to 74 years     Pt states she has had a fever that she has not been able to control for the last 11 days  Pt states she is being treated for a UTI and has been on 2 different medications  Pt states last tylenol was about 2am      Noé Fernandes is a 82-year-old female with no significant past medical history presenting to the emergency department today accompanied by sister for evaluation with complaint of fever  Patient reports that she has been experiencing fevers for 11 days now, noting that she was seen twice prior in the emergency department over the past couple of days for similar  Reports that she will get temp readings around 101/102F with Tmax 104F  She was initially diagnosed with a UTI and discharged home on Keflex  Her urine culture later resulted at 60-69 thousand colonies of mixed contaminants, and patient presently denies any UTI symptoms  She was seen by her PCP in follow-up and ciprofloxacin was added in  She states that she still continues with fevers, which are transiently alleviated by Tylenol  She states that when she experiences fever she feels shaky and will also experience pain across her mid back in addition to generalized body aches  She also reports generalized headaches on occasion, usually while experiencing fever  She denies any neck pain or stiffness, diplopia, change in vision or hearing, dizziness or lightheadedness, gait disturbances or imbalance  She notes that she was previously experiencing nausea and vomiting, both of which have resolved  She continues with decreased appetite and taste aversions  She has no appreciable abdominal pain  She denies any known tick bite or rash  She denies congestion, ear pain, sore throat, cough, chest pain or shortness of breath  She offers no other complaints or concerns at this time            Prior to Admission Medications   Prescriptions Last Dose Informant Patient Reported? Taking? cephalexin (KEFLEX) 500 mg capsule   No No   Sig: Take 1 capsule (500 mg total) by mouth every 12 (twelve) hours for 7 days   ciprofloxacin (CIPRO) 500 mg tablet   Yes Yes   Sig: Take 500 mg by mouth 2 (two) times a day   ondansetron (Zofran ODT) 4 mg disintegrating tablet Not Taking  No No   Sig: Take 1 tablet (4 mg total) by mouth every 6 (six) hours as needed for nausea or vomiting   Patient not taking: Reported on 4/13/2023      Facility-Administered Medications: None       History reviewed  No pertinent past medical history  History reviewed  No pertinent surgical history  History reviewed  No pertinent family history  I have reviewed and agree with the history as documented  E-Cigarette/Vaping     E-Cigarette/Vaping Substances     Social History     Tobacco Use   • Smoking status: Never   Substance Use Topics   • Alcohol use: Yes     Comment: occational    • Drug use: No       Review of Systems   Constitutional: Positive for fever  Respiratory: Negative for shortness of breath  Cardiovascular: Negative for chest pain  Gastrointestinal: Negative for abdominal pain, nausea and vomiting  Genitourinary: Negative for difficulty urinating and flank pain  Musculoskeletal: Positive for myalgias  Neurological: Positive for headaches  All other systems reviewed and are negative  Physical Exam  Physical Exam  Vitals and nursing note reviewed  Constitutional:       General: She is not in acute distress  Appearance: Normal appearance  She is well-developed  She is not ill-appearing, toxic-appearing or diaphoretic  HENT:      Head: Normocephalic and atraumatic  Right Ear: External ear normal       Left Ear: External ear normal       Mouth/Throat:      Mouth: Mucous membranes are moist    Eyes:      Conjunctiva/sclera: Conjunctivae normal    Neck:      Meningeal: Brudzinski's sign absent        Comments: No meningismus  Cardiovascular:      Rate and Rhythm: Normal rate and regular rhythm  Pulses: Normal pulses  Pulmonary:      Effort: Pulmonary effort is normal  No respiratory distress  Breath sounds: Normal breath sounds  No wheezing, rhonchi or rales  Chest:      Chest wall: No tenderness  Abdominal:      General: There is no distension  Palpations: Abdomen is soft  Tenderness: There is no abdominal tenderness  There is no right CVA tenderness, left CVA tenderness, guarding or rebound  Musculoskeletal:         General: Normal range of motion  Cervical back: Full passive range of motion without pain, normal range of motion and neck supple  No edema, erythema or rigidity  Skin:     General: Skin is warm and dry  Capillary Refill: Capillary refill takes less than 2 seconds  Neurological:      Mental Status: She is alert and oriented to person, place, and time  Mental status is at baseline  GCS: GCS eye subscore is 4  GCS verbal subscore is 5  GCS motor subscore is 6  Motor: Motor function is intact  No weakness  Gait: Gait is intact     Psychiatric:         Mood and Affect: Mood normal          Vital Signs  ED Triage Vitals   Temperature Pulse Respirations Blood Pressure SpO2   04/13/23 0943 04/13/23 0943 04/13/23 0943 04/13/23 0943 04/13/23 0943   98 7 °F (37 1 °C) 104 18 112/70 96 %      Temp Source Heart Rate Source Patient Position - Orthostatic VS BP Location FiO2 (%)   04/13/23 0943 04/13/23 0943 04/13/23 1130 04/13/23 0943 --   Oral Monitor Lying Left arm       Pain Score       --                  Vitals:    04/13/23 0943 04/13/23 1130   BP: 112/70 108/63   Pulse: 104 81   Patient Position - Orthostatic VS:  Lying         Visual Acuity      ED Medications  Medications   sodium chloride 0 9 % bolus 1,000 mL (1,000 mL Intravenous New Bag 4/13/23 1116)   iohexol (OMNIPAQUE) 350 MG/ML injection (SINGLE-DOSE) 100 mL (100 mL Intravenous Given 4/13/23 1203)       Diagnostic Studies  Results Reviewed Procedure Component Value Units Date/Time    Manual Differential(PHLEBS Do Not Order) [[id number]]  (Abnormal) Collected: 04/13/23 1113    Lab Status: Final result Specimen: Blood from Arm, Right Updated: 04/13/23 1253     Segmented % 41 %      Bands % 1 %      Lymphocytes % 45 %      Monocytes % 13 %      Eosinophils, % 0 %      Basophils % 0 %      Absolute Neutrophils 4 30 Thousand/uL      Lymphocytes Absolute 4 60 Thousand/uL      Monocytes Absolute 1 33 Thousand/uL      Eosinophils Absolute 0 00 Thousand/uL      Basophils Absolute 0 00 Thousand/uL      Total Counted --     Polychromasia Present     Platelet Estimate Adequate    Protime-INR [[id number]]  (Normal) Collected: 04/13/23 1113    Lab Status: Final result Specimen: Blood from Arm, Right Updated: 04/13/23 1157     Protime 14 5 seconds      INR 1 15    APTT [[id number]]  (Abnormal) Collected: 04/13/23 1113    Lab Status: Final result Specimen: Blood from Arm, Right Updated: 04/13/23 1157     PTT 43 seconds     CBC and differential [[id number]]  (Abnormal) Collected: 04/13/23 1113    Lab Status: Final result Specimen: Blood from Arm, Right Updated: 04/13/23 1155     WBC 10 23 Thousand/uL      RBC 4 14 Million/uL      Hemoglobin 12 2 g/dL      Hematocrit 35 7 %      MCV 86 fL      MCH 29 5 pg      MCHC 34 2 g/dL      RDW 13 2 %      MPV 10 1 fL      Platelets 662 Thousands/uL     Procalcitonin [[id number]]  (Abnormal) Collected: 04/13/23 1113    Lab Status: Final result Specimen: Blood from Arm, Right Updated: 04/13/23 1155     Procalcitonin 0 78 ng/ml     Lactic acid [[id number]]  (Abnormal) Collected: 04/13/23 1113    Lab Status: Final result Specimen: Blood from Arm, Right Updated: 04/13/23 1149     LACTIC ACID 2 8 mmol/L     Narrative:      Result may be elevated if tourniquet was used during collection      Lactic acid 2 Hours [[id number]]     Lab Status: No result Specimen: Blood     Comprehensive metabolic panel [[id number]]  (Abnormal) Collected: 04/13/23 1113    Lab Status: Final result Specimen: Blood from Arm, Right Updated: 04/13/23 1143     Sodium 133 mmol/L      Potassium 3 5 mmol/L      Chloride 101 mmol/L      CO2 23 mmol/L      ANION GAP 9 mmol/L      BUN 10 mg/dL      Creatinine 1 04 mg/dL      Glucose 159 mg/dL      Calcium 9 0 mg/dL      AST 82 U/L      ALT 81 U/L      Alkaline Phosphatase 123 U/L      Total Protein 7 7 g/dL      Albumin 3 6 g/dL      Total Bilirubin 0 96 mg/dL      eGFR 60 ml/min/1 73sq m     Narrative:      National Kid[employer] Disease Foundation guidelines for Chronic Kidney Disease (CKD):   •  Stage 1 with normal or high GFR (GFR > 90 mL/min/1 73 square meters)  •  Stage 2 Mild CKD (GFR = 60-89 mL/min/1 73 square meters)  •  Stage 3A Moderate CKD (GFR = 45-59 mL/min/1 73 square meters)  •  Stage 3B Moderate CKD (GFR = 30-44 mL/min/1 73 square meters)  •  Stage 4 Severe CKD (GFR = 15-29 mL/min/1 73 square meters)  •  Stage 5 End Stage CKD (GFR <15 mL/min/1 73 square meters)  Note: GFR calculation is accurate only with a steady state creatinine    Urine Microscopic [[id number]]  (Abnormal) Collected: 04/13/23 1118    Lab Status: Final result Specimen: Urine, Clean Catch Updated: 04/13/23 1141     RBC, UA 1-2 /hpf      WBC, UA 2-4 /hpf      Epithelial Cells Occasional /hpf      Bacteria, UA None Seen /hpf      MUCUS THREADS Moderate    UA w Reflex to Microscopic w Reflex to Culture [[id number]]  (Abnormal) Collected: 04/13/23 1118    Lab Status: Final result Specimen: Urine, Clean Catch Updated: 04/13/23 1133     Color, UA Yellow     Clarity, UA Clear     Specific Gravity, UA 1 027     pH, UA 5 5     Leukocytes, UA Small     Nitrite, UA Negative     Protein, UA 50 (1+) mg/dl      Glucose, UA Negative mg/dl      Ketones, UA Negative mg/dl      Urobilinogen, UA <2 0 mg/dl      Bilirubin, UA Negative     Occult Blood, UA Negative    Blood culture #1 [[id number]] Collected: 04/13/23 1113    Lab Status:  In process Specimen: Blood from Arm, Right Updated: 04/13/23 1126    Blood culture #2 [[id number]] Collected: 04/13/23 1123    Lab Status: In process Specimen: Blood from Arm, Left Updated: 04/13/23 1125    Lyme Antibody Profile with reflex to [facility] [[id number]] Collected: 04/13/23 1113    Lab Status: In process Specimen: Blood from Arm, Right Updated: 04/13/23 1122    Anaplasma Phagocytophilum, PCR [[id number]] Collected: 04/13/23 1113    Lab Status: In process Specimen: Blood from Arm, Right Updated: 04/13/23 1122                 XR chest pa & lateral   Final Result by Darian Lopez MD (04/13 1120)      Subtle opacity in the medial left base on the frontal projection  While this could be due to atelectasis, pneumonia is not excluded in the appropriate clinical setting  The study was marked in [facility] for immediate notification  Workstation performed: [id number]         CT abdomen pelvis with contrast    (Results Pending)              Procedures  Procedures         ED Course                                             Medical Decision Making  This is a 61-year-old female presenting for evaluation of persistent fever  Reports that she has been experiencing daily fever for the past 11 days  States that her temperatures will typically run around 101 to 102 °F, going as high as 104 °F     Differential diagnosis includes but is not limited to: Fever of uncertain etiology, will expand work-up today to evaluate for evidence of acute cardiopulmonary process to include pneumonia, intra-abdominal pelvic process including acute cholecystitis, appendicitis, diverticulitis, pyelonephritis, urinary tract infection, Lyme or tickborne illness, viral syndrome, COVID/flu/RSV; the patient has no meningeal signs, and feel/encephalitis is less likely    Initial ED plan: Labs, imaging, disposition pending    Final ED Assessment: Vital signs reviewed on ED presentation, patient afebrile and nontoxic in appearance  Examination as above   All labs and imaging independently reviewed with imaging interpreted by the Radiologist   Lab work demonstrates slight leukocytosis of 10 23, and lactic acidosis of 2 8 which cleared with IV fluids  Chest x-ray reports subtle opacity in the medial left base on the frontal projection which could be due to atelectasis though pneumonia is not excluded in the appropriate clinical setting  As the patient has had no congestion, cough, or respiratory complaints, do not feel that her clinical presentation is most consistent with pneumonia  CT reports no acute abdominal pelvic process with mild splenomegaly  I discussed this case with Dr Micah Sam, infectious disease on-call, recommends beginning empiric azithromycin IV/atovaquone, and doxycycline IV, and adding on Lyme antibody, Anaplasma, parasite smear, EBV, CMV  Recommends hospital admission for further management  Test results discussed with the patient and her sister at bedside as well as discussion with infectious disease and recommendation for admission which they are understanding of and agreeable with  Case discussed with Dr John Oakley, who accepts patient for admission, bridging orders placed, patient stable for admission  Fever: acute illness or injury  Amount and/or Complexity of Data Reviewed  Labs: ordered  Radiology: ordered  Risk  Prescription drug management  Decision regarding hospitalization            Disposition  Final diagnoses:   Fever     Time reflects when diagnosis was documented in both MDM as applicable and the Disposition within this note     Time User Action Codes Description Comment    4/13/2023  4:00 PM Shazia Client Add [R50 9] Fever     4/16/2023 10:14 AM Stephany Meadows Add [D64 9] Anemia     4/16/2023 10:17 AM Stephany Meadows Add [D72 820] Lymphocytosis     4/18/2023 11:53 AM Luz Elena Sim Add [R74 01] Transaminitis       ED Disposition     ED Disposition   Admit    Condition   Stable    Date/Time   u Apr 13, 2023  3:59 PM    Comment   Case was discussed with Dr Paradise Esparza and the patient's admission status was agreed to be Admission Status: observation status to the service of Dr Paradise Esparza   Follow-up Information    None         Patient's Medications   Discharge Prescriptions    No medications on file       No discharge procedures on file      PDMP Review     None          ED Provider  Electronically Signed by           Shanel Lofton PA-C  04/18/23 1557

## 2023-04-14 LAB
% PARASITEMIA: 0
% PARASITEMIA: 0
ALBUMIN SERPL BCP-MCNC: 3.1 G/DL (ref 3.5–5)
ALP SERPL-CCNC: 99 U/L (ref 34–104)
ALT SERPL W P-5'-P-CCNC: 71 U/L (ref 7–52)
ANION GAP SERPL CALCULATED.3IONS-SCNC: 5 MMOL/L (ref 4–13)
AST SERPL W P-5'-P-CCNC: 78 U/L (ref 13–39)
ATRIAL RATE: 77 BPM
BACTERIA UR CULT: NORMAL
BASOPHILS # BLD AUTO: 0.07 THOUSANDS/ΜL (ref 0–0.1)
BASOPHILS NFR BLD AUTO: 1 % (ref 0–1)
BILIRUB SERPL-MCNC: 0.92 MG/DL (ref 0.2–1)
BUN SERPL-MCNC: 9 MG/DL (ref 5–25)
CALCIUM ALBUM COR SERPL-MCNC: 9.2 MG/DL (ref 8.3–10.1)
CALCIUM SERPL-MCNC: 8.5 MG/DL (ref 8.4–10.2)
CHLORIDE SERPL-SCNC: 106 MMOL/L (ref 96–108)
CO2 SERPL-SCNC: 26 MMOL/L (ref 21–32)
CREAT SERPL-MCNC: 0.91 MG/DL (ref 0.6–1.3)
EOSINOPHIL # BLD AUTO: 0.08 THOUSAND/ΜL (ref 0–0.61)
EOSINOPHIL NFR BLD AUTO: 1 % (ref 0–6)
ERYTHROCYTE [DISTWIDTH] IN BLOOD BY AUTOMATED COUNT: 13.7 % (ref 11.6–15.1)
GFR SERPL CREATININE-BSD FRML MDRD: 71 ML/MIN/1.73SQ M
GLUCOSE SERPL-MCNC: 116 MG/DL (ref 65–140)
HCT VFR BLD AUTO: 31.5 % (ref 34.8–46.1)
HGB BLD-MCNC: 10.5 G/DL (ref 11.5–15.4)
IMM GRANULOCYTES # BLD AUTO: 0.03 THOUSAND/UL (ref 0–0.2)
IMM GRANULOCYTES NFR BLD AUTO: 0 % (ref 0–2)
LYMPHOCYTES # BLD AUTO: 4.97 THOUSANDS/ΜL (ref 0.6–4.47)
LYMPHOCYTES NFR BLD AUTO: 65 % (ref 14–44)
MCH RBC QN AUTO: 29 PG (ref 26.8–34.3)
MCHC RBC AUTO-ENTMCNC: 33.3 G/DL (ref 31.4–37.4)
MCV RBC AUTO: 87 FL (ref 82–98)
MONOCYTES # BLD AUTO: 0.33 THOUSAND/ΜL (ref 0.17–1.22)
MONOCYTES NFR BLD AUTO: 4 % (ref 4–12)
NEUTROPHILS # BLD AUTO: 2.24 THOUSANDS/ΜL (ref 1.85–7.62)
NEUTS SEG NFR BLD AUTO: 29 % (ref 43–75)
NRBC BLD AUTO-RTO: 0 /100 WBCS
P AXIS: 46 DEGREES
PARASITE BLD: NO
PARASITE BLD: NO
PATHOLOGIST INTERPRETATION: NORMAL
PATHOLOGIST INTERPRETATION: NORMAL
PLATELET # BLD AUTO: 243 THOUSANDS/UL (ref 149–390)
PMV BLD AUTO: 10.3 FL (ref 8.9–12.7)
POTASSIUM SERPL-SCNC: 3.7 MMOL/L (ref 3.5–5.3)
PR INTERVAL: 148 MS
PROCALCITONIN SERPL-MCNC: 0.7 NG/ML
PROT SERPL-MCNC: 6.5 G/DL (ref 6.4–8.4)
QRS AXIS: 34 DEGREES
QRSD INTERVAL: 78 MS
QT INTERVAL: 358 MS
QTC INTERVAL: 405 MS
RBC # BLD AUTO: 3.62 MILLION/UL (ref 3.81–5.12)
SODIUM SERPL-SCNC: 137 MMOL/L (ref 135–147)
T WAVE AXIS: 37 DEGREES
VENTRICULAR RATE: 77 BPM
WBC # BLD AUTO: 7.72 THOUSAND/UL (ref 4.31–10.16)

## 2023-04-14 RX ADMIN — AZITHROMYCIN 500 MG: 500 INJECTION, POWDER, LYOPHILIZED, FOR SOLUTION INTRAVENOUS at 12:31

## 2023-04-14 RX ADMIN — DOXYCYCLINE 100 MG: 100 INJECTION, POWDER, LYOPHILIZED, FOR SOLUTION INTRAVENOUS at 22:09

## 2023-04-14 RX ADMIN — ATOVAQUONE 750 MG: 750 SUSPENSION ORAL at 18:57

## 2023-04-14 RX ADMIN — ACETAMINOPHEN 650 MG: 325 TABLET ORAL at 11:08

## 2023-04-14 RX ADMIN — DOXYCYCLINE 100 MG: 100 INJECTION, POWDER, LYOPHILIZED, FOR SOLUTION INTRAVENOUS at 11:07

## 2023-04-14 RX ADMIN — SODIUM CHLORIDE 100 ML/HR: 0.9 INJECTION, SOLUTION INTRAVENOUS at 22:09

## 2023-04-14 RX ADMIN — ACETAMINOPHEN 650 MG: 325 TABLET ORAL at 22:09

## 2023-04-14 RX ADMIN — ATOVAQUONE 750 MG: 750 SUSPENSION ORAL at 05:43

## 2023-04-14 RX ADMIN — ENOXAPARIN SODIUM 40 MG: 40 INJECTION SUBCUTANEOUS at 11:08

## 2023-04-14 NOTE — CONSULTS
Consultation - Infectious Disease   Mary Rand 56 y o  female MRN: [id number]  Unit/Bed#: -01 Encounter: [id number]      IMPRESSION & RECOMMENDATIONS:   1  Sepsis, developed over admission  Patient documented as having high fever along with tachycardia over the course of admission  She has been experiencing symptoms since the beginning of the month as an outpatient  Work-up including CT, UA, urine cultures and blood cultures have been unremarkable  Exam otherwise nonfocal   Would question given abnormalities below if patient may have a tick related illness or possibly mono  She is agreeable also to baseline HIV and hepatitis testing  Parasite smears x2 negative  We will check 1 final parasite smear tomorrow given dropping hemoglobin  Continue azithromycin IV/atovaquone for now  Continue doxycycline IV for now  Follow-up pending Anaplasma, Ehrlichia, lyme and RMSF testing  Follow-up EBV and CMV testing  Follow-up pending blood cultures  HIV and hepatitis testing ordered for tomorrow  Repeat CBC and CMP ordered for tomorrow  Continue to trend fever curve/vitals  If fevers persist and work-up negative consider peripheral flow cytometry  Additional supportive care as per primary  Additional interventions pending clinical course    2  Developing anemia and transaminitis and lymphocytosis  Patient's labs notable for low level transaminitis and developing anemia  On differential there is a lymphocytosis present as well  Parasite smear notable for atypical lymphocytosis  Differential as above  Repeat CBC and CMP tomorrow  Additional parasite smear tomorrow  Antibiotics as above  Follow-up CMV and EBV testing  Possible flow cytometry/hematology evaluation if fails to improve  Monitor abdominal exam  Additional interventions pending clinical course    Above plan discussed in detail with the patient, primary service and nursing  ID consult service will continue to follow      I have spent a total time of 60 minutes on 04/14/23 in caring for this patient including Diagnostic results, Risks and benefits of tx options, Impressions, Documenting in the medical record, Reviewing / ordering tests, medicine, procedures  , Obtaining or reviewing history   and Communicating with other healthcare professionals   HISTORY OF PRESENT ILLNESS:  Reason for Consult: Fevers    HPI: Mary Rand is a 56y o  year old female without significant past medical or surgical history  She currently works as an   The patient has essentially been feeling poorly since the beginning of April  When she started to develop symptoms she was initially evaluated in the ER and thought to have a viral process and was treated symptomatically  It was thereafter that she was seen again for continued symptoms and diagnosed with possible UTI and was treated with antibiotic  She was then seen by her provider and prescribed a different antibiotic again for possible UTI  As her symptoms persisted she came in for further evaluation  She was reporting on and off intermittent fevers, body aches and headaches  She has not had similar symptoms like this in the past   She had previous HIV and hepatitis testing last year which were negative  She denies any history of drug use  Denies having any intravascular devices in place  The patient does [place] area and has 2 dogs and has seen ticks on these dogs recently  She does not herself recall being bit by a tick  She reports that she has children and she visited one of them in [place] about a month ago  She also had a cabin that she recently went to in the beginning of April and it was there that she started feeling poorly  She currently denies having any nausea, vomiting, chest pain or shortness of breath  She is tolerating current medications without issue  She is agreeable to repeat HIV and hepatitis testing    She reports being sexually active about 2 months ago with 1 partner who is reportedly monogamous  She denies any known history of STI infections in her or her partner  We discussed the possibility of tick related illness and possibly mono  She reports that her daughter recently had mono in January  She offers no other localizing complaints  We are consulted at this time for further assistance with management  REVIEW OF SYSTEMS:  A complete 12 point system-based review of systems is negative other than that noted in the HPI  PAST MEDICAL HISTORY:  History reviewed  No pertinent past medical history  History reviewed  No pertinent surgical history  FAMILY HISTORY:  Non-contributory    SOCIAL HISTORY:  Social History   Social History     Substance and Sexual Activity   Alcohol Use Yes    Comment: occational      Social History     Substance and Sexual Activity   Drug Use No     Social History     Tobacco Use   Smoking Status Never   Smokeless Tobacco Not on file       ALLERGIES:  Allergies   Allergen Reactions   • Sulfa Antibiotics Other (See Comments)     Drops WBC        MEDICATIONS:  All current active medications have been reviewed  PHYSICAL EXAM:  Temp:  [98 °F (36 7 °C)-102 6 °F (39 2 °C)] 98 9 °F (37 2 °C)  HR:  [79-90] 79  Resp:  [17] 17  BP: ()/(57-68) 95/57  SpO2:  [90 %-97 %] 95 %  Temp (24hrs), Av 1 °F (37 8 °C), Min:98 °F (36 7 °C), Max:102 6 °F (39 2 °C)  Current: Temperature: 98 9 °F (37 2 °C)    Intake/Output Summary (Last 24 hours) at 2023 1620  Last data filed at 2023 0548  Gross per 24 hour   Intake 730 ml   Output --   Net 730 ml       General Appearance:  Appearing well, nontoxic, and in no distress; provide detailed history     Head:  Normocephalic, without obvious abnormality, atraumatic   Eyes:  Conjunctiva pink and sclera anicteric, both eyes   Nose: Nares normal, mucosa normal, no drainage   Throat: Oropharynx moist without lesions   Neck: Supple, symmetrical, no adenopathy, no tenderness/mass/nodules Back:   Symmetric, no curvature, ROM normal, no CVA tenderness; no spinal or paraspinal muscle tenderness to palpation  Lungs:   Clear to auscultation bilaterally, respirations unlabored on room air   Chest Wall:  No tenderness or deformity   Heart:  RRR; no murmur, rub or gallop appreciated   Abdomen:   Soft, non-tender, non-distended, positive bowel sounds    Extremities: No cyanosis, clubbing or edema   Skin: No rashes or lesions  No draining wounds noted  Lymph nodes: Cervical, supraclavicular nodes normal   Neurologic: Alert and oriented times 3, extremity strength 5/5 and symmetric       LABS, IMAGING, & OTHER STUDIES:  In completing this consult I have performed an extensive review of the medical records in epic including review of the notes, radiographs, and laboratory results as detailed below  Lab Results:  I have personally reviewed pertinent labs  Comments/Interpretations: White blood cell count elevated on admission which has since downtrended  Patient also noted to have downtrending hemoglobin  Low platelets noted on 4/5  Mild transaminitis noted  Results from last 7 days   Lab Units 04/14/23  0446 04/13/23  1716 04/13/23  1113   WBC Thousand/uL 7 72  --  10 23*   HEMOGLOBIN g/dL 10 5*  --  12 2   PLATELETS Thousands/uL 243 261 263     Results from last 7 days   Lab Units 04/14/23  0446 04/13/23  1113   POTASSIUM mmol/L 3 7 3 5   CHLORIDE mmol/L 106 101   CO2 mmol/L 26 23   BUN mg/dL 9 10   CREATININE mg/dL 0 91 1 04   EGFR ml/min/1 73sq m 71 60   CALCIUM mg/dL 8 5 9 0   AST U/L 78* 82*   ALT U/L 71* 81*   ALK PHOS U/L 99 123*     Results from last 7 days   Lab Units 04/13/23  1123 04/13/23  1118 04/13/23  1113   BLOOD CULTURE  No Growth at 24 hrs  --  Received in Microbiology Lab  Culture in Progress     URINE CULTURE   --  No Growth <1000 cfu/mL  --        Imaging Studies:   I have personally reviewed pertinent imaging study reports and images in PACS   Comments/Interpretations:  CT imaging of the chest, abdomen pelvis reviewed and there is no acute inflammatory findings  The patient has mild splenomegaly  Chest x-ray without infiltrate  Other Studies:   I have personally reviewed other pertinent reports as below  Records in CareEverywhere: Recent office visit reviewed in care everywhere  Documentation noted that UA was abnormal   Unable to see the results  No cultures in Care Everywhere  Current/Prior Cultures: Blood culture so far without growth  Urine cultures unremarkable  Prior urine culture with mixed contaminant  Parasite smears x2 with atypical lymphocytosis but no parasites seen

## 2023-04-14 NOTE — ASSESSMENT & PLAN NOTE
· Presented with intermittent fever up to 104 °F at home for the past 10 days  With leukopenia and thrombocytopenia on OP labs which has since resolved  · Was given Keflex a week ago and transitioned to Cipro few days ago for suspected UTI patient without resolution of fever and outpatient urine culture was negative  · Patient reports generalized body ache, headaches and bilateral lower extremities cramps    Denies any other symptoms  · Chest x-ray unremarkable  · CT abdomen/pelvis negative for acute pathology  · ED spoke with infectious disease who recommended tickborne illness work-up, EBV and CMV and empirically treat for tickborne illness and observe   · Follow-up EBV, CMV blood test  · Follow-up tickborne illness blood work including Lyme, Babesia, Anaplasma and Ehrlichia (patient did have leukopenia and thrombocytopenia a week ago)  · Continue with doxycycline, azithromycin and atovaquone  · Monitor fever curves and WBC counts  · Follow-up urine culture and blood cultures  · Dopplers negative for DVT

## 2023-04-14 NOTE — PROGRESS NOTES
[address] [place] [address]  Progress Note  Name: Sherley Orta  MRN: [id number]  Unit/Bed#: -01 I Date of Admission: 4/13/2023   Date of Service: 4/14/2023 I Hospital Day: 0    Assessment/Plan   * Fever  Assessment & Plan  · Presented with intermittent fever up to 104 °F at home for the past 10 days  With leukopenia and thrombocytopenia on OP labs which has since resolved  · Was given Keflex a week ago and transitioned to Cipro few days ago for suspected UTI patient without resolution of fever and outpatient urine culture was negative  · Patient reports generalized body ache, headaches and bilateral lower extremities cramps  Denies any other symptoms  · Chest x-ray unremarkable  · CT abdomen/pelvis negative for acute pathology  · ED spoke with infectious disease who recommended tickborne illness work-up, EBV and CMV and empirically treat for tickborne illness and observe   · Follow-up EBV, CMV blood test  · Follow-up tickborne illness blood work including Lyme, Babesia, Anaplasma and Ehrlichia (patient did have leukopenia and thrombocytopenia a week ago)  · Continue with doxycycline, azithromycin and atovaquone  · Monitor fever curves and WBC counts  · Follow-up urine culture and blood cultures  · Dopplers negative for DVT    Transaminitis  Assessment & Plan  · Mildly elevated AST and ALT  · Likely secondary to viral or parasite process  · Monitor liver enzymes          VTE Pharmacologic Prophylaxis: VTE Score: 3 Moderate Risk (Score 3-4) - Pharmacological DVT Prophylaxis Ordered: enoxaparin (Lovenox)  Patient Centered Rounds: I performed bedside rounds with nursing staff today  Discussions with Specialists or Other Care Team Provider: case management, ID    Education and Discussions with Family / Patient: Updated  (sister) via phone      Total Time Spent on Date of Encounter in care of patient: 51 minutes This time was spent on one or more of the following: performing physical exam; counseling and coordination of care; obtaining or reviewing history; documenting in the medical record; reviewing/ordering tests, medications or procedures; communicating with other healthcare professionals and discussing with patient's family/caregivers  Current Length of Stay: 0 day(s)  Current Patient Status: Inpatient   Certification Statement: The patient will continue to require additional inpatient hospital stay due to iv antibiotics  Discharge Plan: Anticipate discharge in 24-48 hrs to anticipate home    Code Status: Level 1 - Full Code    Subjective:   Denies chest pain or shortness of breath  With febrile episode tmax 102  6  no abdominal pain  Denies history of rash, wounds  Reports she has a big backyard, also endorsed recent history of camping/fishing and outdoors exposure  Objective:     Vitals:   Temp (24hrs), Av 5 °F (38 1 °C), Min:98 °F (36 7 °C), Max:102 6 °F (39 2 °C)    Temp:  [98 °F (36 7 °C)-102 6 °F (39 2 °C)] 100 1 °F (37 8 °C)  HR:  [80-91] 88  Resp:  [17-20] 17  BP: (101-107)/(52-68) 107/58  SpO2:  [90 %-97 %] 94 %  There is no height or weight on file to calculate BMI  Input and Output Summary (last 24 hours): Intake/Output Summary (Last 24 hours) at 2023 1231  Last data filed at 2023 0548  Gross per 24 hour   Intake 730 ml   Output --   Net 730 ml       Physical Exam:   Physical Exam  Vitals and nursing note reviewed  Constitutional:       General: She is not in acute distress  Appearance: She is not toxic-appearing  HENT:      Head: Normocephalic  Nose: Nose normal       Mouth/Throat:      Mouth: Mucous membranes are moist       Pharynx: Oropharynx is clear  Eyes:      Conjunctiva/sclera: Conjunctivae normal       Pupils: Pupils are equal, round, and reactive to light  Cardiovascular:      Rate and Rhythm: Normal rate  Pulses: Normal pulses     Pulmonary:      Effort: Pulmonary effort is normal       Breath sounds: Normal breath sounds  No wheezing  Abdominal:      Palpations: Abdomen is soft  Tenderness: There is no abdominal tenderness  There is no guarding  Musculoskeletal:         General: Normal range of motion  Cervical back: Normal range of motion  Skin:     General: Skin is warm and dry  Findings: No rash  Comments: (+) abrasion left knee   Neurological:      General: No focal deficit present  Mental Status: She is alert and oriented to person, place, and time  Mental status is at baseline  Psychiatric:         Mood and Affect: Mood normal          Thought Content: Thought content normal          Additional Data:     Labs:  Results from last 7 days   Lab Units 04/14/23  0446 04/13/23  1716 04/13/23  1113   WBC Thousand/uL 7 72  --  10 23*   HEMOGLOBIN g/dL 10 5*  --  12 2   HEMATOCRIT % 31 5*  --  35 7   PLATELETS Thousands/uL 243   < > 263   BANDS PCT %  --   --  1   NEUTROS PCT % 29*  --   --    LYMPHS PCT % 65*  --   --    LYMPHO PCT %  --   --  45*   MONOS PCT % 4  --   --    MONO PCT %  --   --  13*   EOS PCT % 1  --  0    < > = values in this interval not displayed       Results from last 7 days   Lab Units 04/14/23  0446   SODIUM mmol/L 137   POTASSIUM mmol/L 3 7   CHLORIDE mmol/L 106   CO2 mmol/L 26   BUN mg/dL 9   CREATININE mg/dL 0 91   ANION GAP mmol/L 5   CALCIUM mg/dL 8 5   ALBUMIN g/dL 3 1*   TOTAL BILIRUBIN mg/dL 0 92   ALK PHOS U/L 99   ALT U/L 71*   AST U/L 78*   GLUCOSE RANDOM mg/dL 116     Results from last 7 days   Lab Units 04/13/23  1113   INR  1 15             Results from last 7 days   Lab Units 04/14/23  0446 04/13/23  1337 04/13/23  1113   LACTIC ACID mmol/L  --  1 4 2 8*   PROCALCITONIN ng/ml 0 70*  --  0 78*       Lines/Drains:  Invasive Devices     Peripheral Intravenous Line  Duration           Peripheral IV 04/13/23 Right Antecubital 1 day                      Imaging: Reviewed radiology reports from this admission including: chest xray, abdominal/pelvic CT and ultrasound(s)    Recent Cultures (last 7 days):   Results from last 7 days   Lab Units 04/13/23  1123 04/13/23  1113   BLOOD CULTURE  Received in Microbiology Lab  Culture in Progress  Received in Microbiology Lab  Culture in Progress  Last 24 Hours Medication List:   Current Facility-Administered Medications   Medication Dose Route Frequency Provider Last Rate   • acetaminophen  650 mg Oral Q6H PRN Lizbeth Wiseman PA-C     • atovaquone  750 mg Oral Q12H Nickolas Barrera PA-C     • azithromycin  500 mg Intravenous Q24H Concha Pace MD     • doxycycline  100 mg Intravenous Q12H Concha Pace  mg (04/14/23 1107)   • enoxaparin  40 mg Subcutaneous Daily Yuniel Mayes MD     • ondansetron  4 mg Intravenous Q6H PRN Yuniel Mayes MD     • sodium chloride  100 mL/hr Intravenous Continuous Yuniel Mayes  mL/hr (04/13/23 1716)        Today, Patient Was Seen By: Easton Baxter MD    **Please Note: This note may have been constructed using a voice recognition system  **

## 2023-04-14 NOTE — CASE MANAGEMENT
Case Management Progress Note    Patient name David Vincent  Location [facility] [address] 317/-[id number] MRN [id number]  : 1965 Date 2023       LOS (days): 0  Geometric Mean LOS (GMLOS) (days): 2 60  Days to GMLOS:2 5        OBJECTIVE:        Current admission status: Inpatient  Preferred Pharmacy:   [address], [address]  Phone: [phone] Fax: [phone]    Primary Care Provider: Laurita Palacios DO    Primary Insurance: [facility]  Secondary Insurance:     PROGRESS NOTE:    Per rounding with SLIM, patient has a fever with an unknown source  Labs are still pending, and she is on IV abx  She was independent prior to admission and has no CM needs anticipated  CM department will continue to follow patient through discharge

## 2023-04-14 NOTE — PLAN OF CARE
Problem: Nutrition/Hydration-ADULT  Goal: Nutrient/Hydration intake appropriate for improving, restoring or maintaining nutritional needs  Description: Monitor and assess patient's nutrition/hydration status for malnutrition  Collaborate with interdisciplinary team and initiate plan and interventions as ordered  Monitor patient's weight and dietary intake as ordered or per policy  Utilize nutrition screening tool and intervene as necessary  Determine patient's food preferences and provide high-protein, high-caloric foods as appropriate       INTERVENTIONS:  - Monitor oral intake, urinary output, labs, and treatment plans  - Assess nutrition and hydration status and recommend course of action  - Evaluate amount of meals eaten  - Assist patient with eating if necessary   - Allow adequate time for meals  - Recommend/ encourage appropriate diets, oral nutritional supplements, and vitamin/mineral supplements  - Order, calculate, and assess calorie counts as needed  - Recommend, monitor, and adjust tube feedings and TPN/PPN based on assessed needs  - Assess need for intravenous fluids  - Provide specific nutrition/hydration education as appropriate  - Include patient/family/caregiver in decisions related to nutrition  Outcome: Progressing     Problem: INFECTION - ADULT  Goal: Absence or prevention of progression during hospitalization  Description: INTERVENTIONS:  - Assess and monitor for signs and symptoms of infection  - Monitor lab/diagnostic results  - Monitor all insertion sites, i e  indwelling lines, tubes, and drains  - Monitor endotracheal if appropriate and nasal secretions for changes in amount and color  - [place] appropriate cooling/warming therapies per order  - Administer medications as ordered  - Instruct and encourage patient and family to use good hand hygiene technique  - Identify and instruct in appropriate isolation precautions for identified infection/condition  Outcome: Not Progressing Problem: Knowledge Deficit  Goal: Patient/family/caregiver demonstrates understanding of disease process, treatment plan, medications, and discharge instructions  Description: Complete learning assessment and assess knowledge base    Interventions:  - Provide teaching at level of understanding  - Provide teaching via preferred learning methods  Outcome: Not Progressing

## 2023-04-14 NOTE — UTILIZATION REVIEW
Initial Clinical Review    Pt initially admitted as Observation on 4/13  Changed to Inpatient on 4/14  Pt requiring continued stay d/t fever w/ unknown cause, ongoing workup and treatment, pending Infectious Disease consult  Admission: Date/Time/Statement:   Admission Orders (From admission, onward)     Ordered        04/14/23 1148  Inpatient Admission  Once            04/13/23 1610  Place in Observation  Once                      Orders Placed This Encounter   Procedures   • Inpatient Admission     Standing Status:   Standing     Number of Occurrences:   1     Order Specific Question:   Level of Care     Answer:   Med Surg [16]     Order Specific Question:   Estimated length of stay     Answer:   More than 2 Midnights     Order Specific Question:   Certification     Answer:   I certify that inpatient services are medically necessary for this patient for a duration of greater than two midnights  See H&P and MD Progress Notes for additional information about the patient's course of treatment  ED Arrival Information     Expected   -    Arrival   4/13/2023 09:30    Acuity   Urgent            Means of arrival   Walk-In    Escorted by   Family Member    Service   Hospitalist    Admission type   Emergency            Arrival complaint   FEVER           Chief Complaint   Patient presents with   • Fever - 9 weeks to 74 years     Pt states she has had a fever that she has not been able to control for the last 11 days  Pt states she is being treated for a UTI and has been on 2 different medications  Pt states last tylenol was about 2am        Initial Presentation: 56 y o  female who presented self from home to [facility] ED  Admitted in observation status for evaluation and treatment of fever  Presented w/ intermittent fever up to 104F for 10 days  Endorses associated body aches, LE crampes, headache   Was given Keflex a week ago and transitioned to Cipro few days ago for suspected UTI patient without resolution of fever and outpatient urine culture was negative  On exam, R calf tenderness  Lactic acid 2 8  Plan: workup for tickborne illness, EBCV/CMV, continue doxycycline, azithromycin, atovaquone; monitor fever and WBC, follow urine/blood cultures, Trend labs, replete electrolytes as needed; LONG        04/14/23 Changed to Inpatient Status:  Pt w/ tmax 102 6 F overnight  On exam, L knee abrasion  Doppler negative for DVT  Plan: follow tickborne illness workup results, continue current meds, follow urine/blood cultures, IVF, Trend labs, replete electrolytes as needed  Infectious Disease: Pt w/ sepsis, high fevers and tachycardia  Parasite smears x2 negative, check 1 more  IV azithromycin, PO atovaquone, IV doxycycline; follow all pending labs and cultures, Trend labs, replete electrolytes as needed  HIV & hepatitis labs ordered  Date: 04/15/23 Day 2: Febrile episode overnight, Tmax 102 9  Exam unchanged  Plan: continue current meds, follow all pending labs and cultures, check iron panel, Trend labs, replete electrolytes as needed           ED Triage Vitals   Temperature Pulse Respirations Blood Pressure SpO2   04/13/23 0943 04/13/23 0943 04/13/23 0943 04/13/23 0943 04/13/23 0943   98 7 °F (37 1 °C) 104 18 112/70 96 %      Temp Source Heart Rate Source Patient Position - Orthostatic VS BP Location FiO2 (%)   04/13/23 0943 04/13/23 0943 04/13/23 1130 04/13/23 0943 --   Oral Monitor Lying Left arm       Pain Score       04/13/23 2113       Med Not Given for Pain - for MAR use only          Wt Readings from Last 1 Encounters:   04/05/23 88 5 kg (195 lb)     Additional Vital Signs:   Date/Time Temp Pulse Resp BP MAP (mmHg) SpO2 O2 Device   04/15/23 06:43:01 99 2 °F (37 3 °C) 85 -- 103/61 75 94 % --   04/15/23 00:52:15 99 2 °F (37 3 °C) -- -- -- -- 95 % --   04/14/23 21:45:20 102 9 °F (39 4 °C) Abnormal  100 -- 113/75 88 95 % --   04/14/23 2000 -- -- -- -- -- 95 % None (Room air)   04/14/23 15:18:35 98 9 °F (37 2 °C) -- -- 95/57 70 -- --   04/14/23 15:17:54 98 9 °F (37 2 °C) 79 -- 95/57 70 95 % --   04/14/23 14:34:22 98 3 °F (36 8 °C) 81 -- 104/59 74 93 % --   04/14/23 1100 -- -- -- -- -- -- None (Room air)   04/14/23 09:30:17 100 1 °F (37 8 °C) -- -- -- -- -- --     Date/Time Temp Pulse Resp BP MAP (mmHg) SpO2 O2 Device   04/14/23 06:46:59 102 °F (38 9 °C) Abnormal  88 -- 107/58 74 94 % --   04/14/23 06:46:46 102 °F (38 9 °C) Abnormal  89 -- -- -- 95 % --   04/14/23 05:42:37 98 °F (36 7 °C) 80 -- -- -- 97 % --   04/13/23 21:13:38 99 9 °F (37 7 °C) 90 -- -- -- 92 % --   04/13/23 19:52:57 102 6 °F (39 2 °C) Abnormal  90 17 103/68 80 93 % --   04/13/23 1530 99 7 °F (37 6 °C) 90 18 102/52 73 94 % None (Room air)   04/13/23 1400 -- 91 20 103/57 74 94 % None (Room air)   04/13/23 1341 99 4 °F (37 4 °C) 86 20 101/58 -- 95 % None (Room air)   04/13/23 1130 -- 81 21 108/63 81 95 % None (Room air)   04/13/23 1000 -- -- -- -- -- -- None (Room air)   04/13/23 0954 99 1 °F (37 3 °C) -- -- -- -- -- --     Pertinent Labs/Diagnostic Test Results:   VAS lower limb venous duplex study, unilateral/limited   Final Result by Melisa Hernandez DO (04/13 2214)      CT abdomen pelvis with contrast   Final Result by Wojciech Saldana MD (04/13 1324)      No evidence of acute abdominopelvic process  Mild splenomegaly  Minimal sigmoid diverticulosis  Workstation performed: [id number]         XR chest pa & lateral   Final Result by Brent Miller MD (04/13 1120)      Subtle opacity in the medial left base on the frontal projection  While this could be due to atelectasis, pneumonia is not excluded in the appropriate clinical setting  The study was marked in [facility] for immediate notification                 Workstation performed: [id number]           Results from last 7 days   Lab Units 04/13/23  1532   SARS-COV-2  Negative     Results from last 7 days   Lab Units 04/15/23  0444 04/14/23  0446 04/13/23  1716 04/13/23  1113 WBC Thousand/uL 9 04 7 72  --  10 23*   HEMOGLOBIN g/dL 9 9* 10 5*  --  12 2   HEMATOCRIT % 30 6* 31 5*  --  35 7   PLATELETS Thousands/uL 252 243 261 263   NEUTROS ABS Thousands/µL 2 53 2 24  --   --    BANDS PCT %  --   --   --  1         Results from last 7 days   Lab Units 04/15/23  0444 04/14/23  0446 04/13/23  1113   SODIUM mmol/L 136 137 133*   POTASSIUM mmol/L 3 5 3 7 3 5   CHLORIDE mmol/L 107 106 101   CO2 mmol/L 23 26 23   ANION GAP mmol/L 6 5 9   BUN mg/dL 8 9 10   CREATININE mg/dL 0 88 0 91 1 04   EGFR ml/min/1 73sq m 74 71 60   CALCIUM mg/dL 8 2* 8 5 9 0     Results from last 7 days   Lab Units 04/15/23  0444 04/14/23  0446 04/13/23  1113   AST U/L 98* 78* 82*   ALT U/L 80* 71* 81*   ALK PHOS U/L 108* 99 123*   TOTAL PROTEIN g/dL 6 3* 6 5 7 7   ALBUMIN g/dL 2 9* 3 1* 3 6   TOTAL BILIRUBIN mg/dL 1 03* 0 92 0 96         Results from last 7 days   Lab Units 04/15/23  0444 04/14/23  0446 04/13/23  1113   GLUCOSE RANDOM mg/dL 124 116 159*     Results from last 7 days   Lab Units 04/13/23  1113   PROTIME seconds 14 5   INR  1 15   PTT seconds 43*         Results from last 7 days   Lab Units 04/14/23 0446 04/13/23  1113   PROCALCITONIN ng/ml 0 70* 0 78*     Results from last 7 days   Lab Units 04/13/23  1337 04/13/23  1113   LACTIC ACID mmol/L 1 4 2 8*     Results from last 7 days   Lab Units 04/13/23  1118   CLARITY UA  Clear   COLOR UA  Yellow   SPEC GRAV UA  1 027   PH UA  5 5   GLUCOSE UA mg/dl Negative   KETONES UA mg/dl Negative   BLOOD UA  Negative   PROTEIN UA mg/dl 50 (1+)*   NITRITE UA  Negative   BILIRUBIN UA  Negative   UROBILINOGEN UA (BE) mg/dl <2 0   LEUKOCYTES UA  Small*   WBC UA /hpf 2-4*   RBC UA /hpf 1-2   BACTERIA UA /hpf None Seen   EPITHELIAL CELLS WET PREP /hpf Occasional   MUCUS THREADS  Moderate*     Results from last 7 days   Lab Units 04/13/23  1532   INFLUENZA A PCR  Negative   INFLUENZA B PCR  Negative   RSV PCR  Negative     Results from last 7 days   Lab Units 04/13/23  1123 04/13/23  1118 04/13/23  1113   BLOOD CULTURE  No Growth at 24 hrs   --  No Growth at 24 hrs  URINE CULTURE   --  No Growth <1000 cfu/mL  --            ED Treatment:   Medication Administration from 04/13/2023 0930 to 04/13/2023 1942       Date/Time Order Dose Route Action     04/13/2023 1116 EDT sodium chloride 0 9 % bolus 1,000 mL 1,000 mL Intravenous New Bag     04/13/2023 1203 EDT iohexol (OMNIPAQUE) 350 MG/ML injection (SINGLE-DOSE) 100 mL 100 mL Intravenous Given     04/13/2023 1714 EDT azithromycin (ZITHROMAX) 500 mg in sodium chloride 0 9% 250mL IVPB 500 mg 500 mg Intravenous New Bag     04/13/2023 1807 EDT atovaquone (MEPRON) oral suspension 750 mg 750 mg Oral Given     04/13/2023 1716 EDT sodium chloride 0 9 % infusion 100 mL/hr Intravenous New Bag        History reviewed  No pertinent past medical history  Present on Admission:  • Fever  • Transaminitis      Admitting Diagnosis: Fever [R50 9]  Age/Sex: 56 y o  female  Admission Orders:  Regular Diet  Scheduled Medications:  atovaquone, 750 mg, Oral, Q12H  azithromycin, 500 mg, Intravenous, Q24H  doxycycline, 100 mg, Intravenous, Q12H  enoxaparin, 40 mg, Subcutaneous, Daily    Continuous IV Infusions:    sodium chloride 0 9 %, 100 mL/hr, Intravenous, Continuous    PRN Meds:  acetaminophen, 650 mg, Oral; 4/13 x1; 4/14 x2  ondansetron, 4 mg, Intravenous, Q6H PRN        IP CONSULT TO INFECTIOUS DISEASES    Network Utilization Review Department  ATTENTION: Please call with any questions or concerns to [phone] and carefully listen to the prompts so that you are directed to the right person  All voicemails are confidential   Juan Coffman all requests for admission clinical reviews, approved or denied determinations and any other requests to dedicated fax number below belonging to the campus where the patient is receiving treatment   List of dedicated fax numbers for the Facilities:  FACILITY NAME UR FAX NUMBER   ADMISSION DENIALS (Administrative/Medical [facility]) [phone]   [address] (Maternity/NICU/Pediatrics) hSasha Finch [address] Kadie Erickson  [phone]   [facility] [place] [id number] [place] [address] [address] [facility] [place] [address] [phone]

## 2023-04-15 PROBLEM — D64.9 ANEMIA: Status: ACTIVE | Noted: 2023-04-15

## 2023-04-15 LAB
ALBUMIN SERPL BCP-MCNC: 2.9 G/DL (ref 3.5–5)
ALP SERPL-CCNC: 108 U/L (ref 34–104)
ALT SERPL W P-5'-P-CCNC: 80 U/L (ref 7–52)
ANION GAP SERPL CALCULATED.3IONS-SCNC: 6 MMOL/L (ref 4–13)
AST SERPL W P-5'-P-CCNC: 98 U/L (ref 13–39)
BASOPHILS # BLD AUTO: 0.05 THOUSANDS/ΜL (ref 0–0.1)
BASOPHILS NFR BLD AUTO: 1 % (ref 0–1)
BILIRUB SERPL-MCNC: 1.03 MG/DL (ref 0.2–1)
BUN SERPL-MCNC: 8 MG/DL (ref 5–25)
CALCIUM ALBUM COR SERPL-MCNC: 9.1 MG/DL (ref 8.3–10.1)
CALCIUM SERPL-MCNC: 8.2 MG/DL (ref 8.4–10.2)
CHLORIDE SERPL-SCNC: 107 MMOL/L (ref 96–108)
CMV IGG SERPL IA-ACNC: 0.74 U/ML (ref 0–0.59)
CMV IGM SERPL IA-ACNC: 221 AU/ML (ref 0–29.9)
CO2 SERPL-SCNC: 23 MMOL/L (ref 21–32)
CREAT SERPL-MCNC: 0.88 MG/DL (ref 0.6–1.3)
EBV NA IGG SER IA-ACNC: 366 U/ML (ref 0–17.9)
EBV VCA IGG SER IA-ACNC: >600 U/ML (ref 0–17.9)
EBV VCA IGM SER IA-ACNC: 99.4 U/ML (ref 0–35.9)
EOSINOPHIL # BLD AUTO: 0.05 THOUSAND/ΜL (ref 0–0.61)
EOSINOPHIL NFR BLD AUTO: 1 % (ref 0–6)
ERYTHROCYTE [DISTWIDTH] IN BLOOD BY AUTOMATED COUNT: 14 % (ref 11.6–15.1)
FERRITIN SERPL-MCNC: 1125 NG/ML (ref 8–388)
GFR SERPL CREATININE-BSD FRML MDRD: 74 ML/MIN/1.73SQ M
GLUCOSE SERPL-MCNC: 124 MG/DL (ref 65–140)
HCT VFR BLD AUTO: 30.6 % (ref 34.8–46.1)
HGB BLD-MCNC: 9.9 G/DL (ref 11.5–15.4)
HIV 1+2 AB+HIV1 P24 AG SERPL QL IA: NORMAL
HIV1 P24 AG SER QL: NORMAL
IMM GRANULOCYTES # BLD AUTO: 0.03 THOUSAND/UL (ref 0–0.2)
IMM GRANULOCYTES NFR BLD AUTO: 0 % (ref 0–2)
INTERPRETATION: ABNORMAL
IRON SATN MFR SERPL: 20 % (ref 15–50)
IRON SERPL-MCNC: 39 UG/DL (ref 50–170)
LYMPHOCYTES # BLD AUTO: 6.08 THOUSANDS/ΜL (ref 0.6–4.47)
LYMPHOCYTES NFR BLD AUTO: 67 % (ref 14–44)
MCH RBC QN AUTO: 28.7 PG (ref 26.8–34.3)
MCHC RBC AUTO-ENTMCNC: 32.4 G/DL (ref 31.4–37.4)
MCV RBC AUTO: 89 FL (ref 82–98)
MONOCYTES # BLD AUTO: 0.3 THOUSAND/ΜL (ref 0.17–1.22)
MONOCYTES NFR BLD AUTO: 3 % (ref 4–12)
NEUTROPHILS # BLD AUTO: 2.53 THOUSANDS/ΜL (ref 1.85–7.62)
NEUTS SEG NFR BLD AUTO: 28 % (ref 43–75)
NRBC BLD AUTO-RTO: 0 /100 WBCS
PLATELET # BLD AUTO: 252 THOUSANDS/UL (ref 149–390)
PMV BLD AUTO: 10.2 FL (ref 8.9–12.7)
POTASSIUM SERPL-SCNC: 3.5 MMOL/L (ref 3.5–5.3)
PROT SERPL-MCNC: 6.3 G/DL (ref 6.4–8.4)
R RICKETTSI IGG SER QL IA: NEGATIVE
RBC # BLD AUTO: 3.45 MILLION/UL (ref 3.81–5.12)
SODIUM SERPL-SCNC: 136 MMOL/L (ref 135–147)
TIBC SERPL-MCNC: 199 UG/DL (ref 250–450)
WBC # BLD AUTO: 9.04 THOUSAND/UL (ref 4.31–10.16)

## 2023-04-15 RX ORDER — ACETAMINOPHEN 325 MG/1
650 TABLET ORAL EVERY 6 HOURS PRN
Status: DISCONTINUED | OUTPATIENT
Start: 2023-04-15 | End: 2023-04-15

## 2023-04-15 RX ORDER — DIPHENHYDRAMINE HCL 25 MG
25 TABLET ORAL EVERY 6 HOURS PRN
Status: DISCONTINUED | OUTPATIENT
Start: 2023-04-15 | End: 2023-04-18 | Stop reason: HOSPADM

## 2023-04-15 RX ORDER — ACETAMINOPHEN 325 MG/1
650 TABLET ORAL EVERY 6 HOURS PRN
Status: DISCONTINUED | OUTPATIENT
Start: 2023-04-15 | End: 2023-04-18 | Stop reason: HOSPADM

## 2023-04-15 RX ADMIN — ACETAMINOPHEN 650 MG: 325 TABLET ORAL at 14:45

## 2023-04-15 RX ADMIN — ATOVAQUONE 750 MG: 750 SUSPENSION ORAL at 05:24

## 2023-04-15 RX ADMIN — AZITHROMYCIN 500 MG: 500 INJECTION, POWDER, LYOPHILIZED, FOR SOLUTION INTRAVENOUS at 10:12

## 2023-04-15 RX ADMIN — SODIUM CHLORIDE 100 ML/HR: 0.9 INJECTION, SOLUTION INTRAVENOUS at 08:53

## 2023-04-15 RX ADMIN — DOXYCYCLINE 100 MG: 100 INJECTION, POWDER, LYOPHILIZED, FOR SOLUTION INTRAVENOUS at 08:51

## 2023-04-15 RX ADMIN — ATOVAQUONE 750 MG: 750 SUSPENSION ORAL at 17:36

## 2023-04-15 RX ADMIN — DOXYCYCLINE 100 MG: 100 INJECTION, POWDER, LYOPHILIZED, FOR SOLUTION INTRAVENOUS at 20:16

## 2023-04-15 NOTE — PLAN OF CARE
Problem: Nutrition/Hydration-ADULT  Goal: Nutrient/Hydration intake appropriate for improving, restoring or maintaining nutritional needs  Description: Monitor and assess patient's nutrition/hydration status for malnutrition  Collaborate with interdisciplinary team and initiate plan and interventions as ordered  Monitor patient's weight and dietary intake as ordered or per policy  Utilize nutrition screening tool and intervene as necessary  Determine patient's food preferences and provide high-protein, high-caloric foods as appropriate       INTERVENTIONS:  - Monitor oral intake, urinary output, labs, and treatment plans  - Assess nutrition and hydration status and recommend course of action  - Evaluate amount of meals eaten  - Assist patient with eating if necessary   - Allow adequate time for meals  - Recommend/ encourage appropriate diets, oral nutritional supplements, and vitamin/mineral supplements  - Order, calculate, and assess calorie counts as needed  - Recommend, monitor, and adjust tube feedings and TPN/PPN based on assessed needs  - Assess need for intravenous fluids  - Provide specific nutrition/hydration education as appropriate  - Include patient/family/caregiver in decisions related to nutrition  Outcome: Progressing     Problem: INFECTION - ADULT  Goal: Absence or prevention of progression during hospitalization  Description: INTERVENTIONS:  - Assess and monitor for signs and symptoms of infection  - Monitor lab/diagnostic results  - Monitor all insertion sites, i e  indwelling lines, tubes, and drains  - Monitor endotracheal if appropriate and nasal secretions for changes in amount and color  - [place] appropriate cooling/warming therapies per order  - Administer medications as ordered  - Instruct and encourage patient and family to use good hand hygiene technique  - Identify and instruct in appropriate isolation precautions for identified infection/condition  Outcome: Progressing     Problem: Knowledge Deficit  Goal: Patient/family/caregiver demonstrates understanding of disease process, treatment plan, medications, and discharge instructions  Description: Complete learning assessment and assess knowledge base    Interventions:  - Provide teaching at level of understanding  - Provide teaching via preferred learning methods  Outcome: Progressing

## 2023-04-15 NOTE — ASSESSMENT & PLAN NOTE
· Suspect in the setting of infection, possibly ?dilutional contributing  · Check iron panel  · Follow up infectious work up

## 2023-04-15 NOTE — NURSING NOTE
"Patient reported \"I have a rash\"  Primary nurse observed back of patients right upper arm and noted with a small area of faint pink, pin point, non-raised,  round spots  Patient denies any itching  No swelling to face or extremities noted  No respiratory distress observed  No active bleeding  Platelet count 258  SLIM notified with new orders  Will continue to monitor patient for any changes in condition      " Recommended observation.

## 2023-04-15 NOTE — ASSESSMENT & PLAN NOTE
· Presented with intermittent fever up to 104 °F at home for the past 10 days  With leukopenia and thrombocytopenia on OP labs which has since resolved  · Was given Keflex a week ago and transitioned to Cipro few days ago for suspected UTI patient without resolution of fever and outpatient urine culture was negative  · Patient reports generalized body ache, headaches and bilateral lower extremities cramps  Denies any other symptoms  · Chest x-ray unremarkable  · CT abdomen/pelvis negative for acute pathology  · ED spoke with infectious disease who recommended tickborne illness work-up, EBV and CMV and empirically treat for tickborne illness and observe   · Follow-up EBV, CMV blood test  · Lyme unremarkable;  Follow-up tickborne illness blood work including Babesia, Anaplasma and Ehrlichia  · Continue with doxycycline, azithromycin and atovaquone  · Monitor fever curves and WBC counts  · Follow up final results of BC  · Dopplers negative for DVT  · If work up unrevealing and fever persists recommending peripheral flow cytometry

## 2023-04-15 NOTE — PROGRESS NOTES
[address] [place] [address]  Progress Note  Name: Luis Fernando Jeffery  MRN: [id number]  Unit/Bed#: -01 I Date of Admission: 4/13/2023   Date of Service: 4/15/2023 I Hospital Day: 1    Assessment/Plan   * Fever  Assessment & Plan  · Presented with intermittent fever up to 104 °F at home for the past 10 days  With leukopenia and thrombocytopenia on OP labs which has since resolved  · Was given Keflex a week ago and transitioned to Cipro few days ago for suspected UTI patient without resolution of fever and outpatient urine culture was negative  · Patient reports generalized body ache, headaches and bilateral lower extremities cramps  Denies any other symptoms  · Chest x-ray unremarkable  · CT abdomen/pelvis negative for acute pathology  · ED spoke with infectious disease who recommended tickborne illness work-up, EBV and CMV and empirically treat for tickborne illness and observe   · Follow-up EBV, CMV blood test  · Lyme unremarkable; Follow-up tickborne illness blood work including Babesia, Anaplasma and Ehrlichia  · Continue with doxycycline, azithromycin and atovaquone  · Monitor fever curves and WBC counts  · Follow up final results of BC  · Dopplers negative for DVT  · If work up unrevealing and fever persists recommending peripheral flow cytometry    Anemia  Assessment & Plan  · Suspect in the setting of infection, possibly ?dilutional contributing  · Check iron panel  · Follow up infectious work up    Transaminitis  Assessment & Plan  · Mildly elevated AST and ALT  · Likely secondary to viral or parasite process  · Monitor liver enzymes          VTE Pharmacologic Prophylaxis: VTE Score: 3 Moderate Risk (Score 3-4) - Pharmacological DVT Prophylaxis Ordered: enoxaparin (Lovenox)  Patient Centered Rounds: I performed bedside rounds with nursing staff today    Discussions with Specialists or Other Care Team Provider: case management, ID    Education and Discussions with Family / Patient: Patient declined call to   Total Time Spent on Date of Encounter in care of patient: 51 minutes This time was spent on one or more of the following: performing physical exam; counseling and coordination of care; obtaining or reviewing history; documenting in the medical record; reviewing/ordering tests, medications or procedures; communicating with other healthcare professionals and discussing with patient's family/caregivers  Current Length of Stay: 1 day(s)  Current Patient Status: Inpatient   Certification Statement: The patient will continue to require additional inpatient hospital stay due to IV antibiotics, ID work up  Discharge Plan: Anticipate discharge in 48 hrs to anticipate home    Code Status: Level 1 - Full Code    Subjective:   Denies chest pain or shortness of breath  Reports that she felt good yesterday afternoon, appetite was good  However with noted febrile episode last night Tmax 102 9  Objective:     Vitals:   Temp (24hrs), Av 6 °F (37 6 °C), Min:98 3 °F (36 8 °C), Max:102 9 °F (39 4 °C)    Temp:  [98 3 °F (36 8 °C)-102 9 °F (39 4 °C)] 99 2 °F (37 3 °C)  HR:  [] 85  BP: ()/(57-75) 103/61  SpO2:  [93 %-95 %] 94 %  There is no height or weight on file to calculate BMI  Input and Output Summary (last 24 hours): Intake/Output Summary (Last 24 hours) at 4/15/2023 0818  Last data filed at 2023 1830  Gross per 24 hour   Intake 720 ml   Output 3 ml   Net 717 ml       Physical Exam:   Physical Exam  Vitals and nursing note reviewed  Constitutional:       General: She is not in acute distress  Appearance: She is not toxic-appearing  HENT:      Head: Normocephalic  Nose: Nose normal       Mouth/Throat:      Mouth: Mucous membranes are moist       Pharynx: Oropharynx is clear  Eyes:      Conjunctiva/sclera: Conjunctivae normal       Pupils: Pupils are equal, round, and reactive to light  Cardiovascular:      Rate and Rhythm: Normal rate  Pulses: Normal pulses  Pulmonary:      Effort: Pulmonary effort is normal       Breath sounds: Normal breath sounds  No wheezing  Abdominal:      Palpations: Abdomen is soft  Tenderness: There is no abdominal tenderness  There is no guarding  Musculoskeletal:         General: Normal range of motion  Cervical back: Normal range of motion  Skin:     General: Skin is warm and dry  Findings: No rash  Comments: (+) abrasion left knee   Neurological:      General: No focal deficit present  Mental Status: She is alert and oriented to person, place, and time  Mental status is at baseline  Psychiatric:         Mood and Affect: Mood normal          Thought Content: Thought content normal          Additional Data:     Labs:  Results from last 7 days   Lab Units 04/15/23  0444 04/13/23  1716 04/13/23  1113   WBC Thousand/uL 9 04   < > 10 23*   HEMOGLOBIN g/dL 9 9*   < > 12 2   HEMATOCRIT % 30 6*   < > 35 7   PLATELETS Thousands/uL 252   < > 263   BANDS PCT %  --   --  1   NEUTROS PCT % 28*   < >  --    LYMPHS PCT % 67*   < >  --    LYMPHO PCT %  --   --  45*   MONOS PCT % 3*   < >  --    MONO PCT %  --   --  13*   EOS PCT % 1   < > 0    < > = values in this interval not displayed       Results from last 7 days   Lab Units 04/15/23  0444   SODIUM mmol/L 136   POTASSIUM mmol/L 3 5   CHLORIDE mmol/L 107   CO2 mmol/L 23   BUN mg/dL 8   CREATININE mg/dL 0 88   ANION GAP mmol/L 6   CALCIUM mg/dL 8 2*   ALBUMIN g/dL 2 9*   TOTAL BILIRUBIN mg/dL 1 03*   ALK PHOS U/L 108*   ALT U/L 80*   AST U/L 98*   GLUCOSE RANDOM mg/dL 124     Results from last 7 days   Lab Units 04/13/23  1113   INR  1 15             Results from last 7 days   Lab Units 04/14/23  0446 04/13/23  1337 04/13/23  1113   LACTIC ACID mmol/L  --  1 4 2 8*   PROCALCITONIN ng/ml 0 70*  --  0 78*       Lines/Drains:  Invasive Devices     Peripheral Intravenous Line  Duration           Peripheral IV 04/13/23 Right Antecubital 1 day Imaging: No pertinent imaging reviewed  Recent Cultures (last 7 days):   Results from last 7 days   Lab Units 04/13/23  1123 04/13/23  1118 04/13/23  1113   BLOOD CULTURE  No Growth at 24 hrs   --  No Growth at 24 hrs  URINE CULTURE   --  No Growth <1000 cfu/mL  --        Last 24 Hours Medication List:   Current Facility-Administered Medications   Medication Dose Route Frequency Provider Last Rate   • atovaquone  750 mg Oral Q12H Lainey Rodriguez PA-C     • azithromycin  500 mg Intravenous Q24H Diana Weeks  mg (04/14/23 1231)   • doxycycline  100 mg Intravenous Q12H Diana Weeks  mg (04/14/23 2209)   • enoxaparin  40 mg Subcutaneous Daily Bear Murrell MD     • ondansetron  4 mg Intravenous Q6H PRN Bear Murrell MD     • sodium chloride  100 mL/hr Intravenous Continuous Bear Murrell  mL/hr (04/14/23 2209)        Today, Patient Was Seen By: oSnya Grayson MD    **Please Note: This note may have been constructed using a voice recognition system  **

## 2023-04-15 NOTE — PLAN OF CARE
Problem: Potential for Falls  Goal: Patient will remain free of falls  Description: INTERVENTIONS:  - Educate patient/family on patient safety including physical limitations  - Instruct patient to call for assistance with activity   - Consult OT/PT to assist with strengthening/mobility   - Keep Call bell within reach  - Keep bed low and locked with side rails adjusted as appropriate  - Keep care items and personal belongings within reach  - Initiate and maintain comfort rounds  - Make Fall Risk Sign visible to staff  - Apply yellow socks and bracelet for high fall risk patients  - Consider moving patient to room near nurses station  Outcome: Progressing     Problem: Nutrition/Hydration-ADULT  Goal: Nutrient/Hydration intake appropriate for improving, restoring or maintaining nutritional needs  Description: Monitor and assess patient's nutrition/hydration status for malnutrition  Collaborate with interdisciplinary team and initiate plan and interventions as ordered  Monitor patient's weight and dietary intake as ordered or per policy  Utilize nutrition screening tool and intervene as necessary  Determine patient's food preferences and provide high-protein, high-caloric foods as appropriate       INTERVENTIONS:  - Monitor oral intake, urinary output, labs, and treatment plans  - Assess nutrition and hydration status and recommend course of action  - Evaluate amount of meals eaten  - Assist patient with eating if necessary   - Allow adequate time for meals  - Recommend/ encourage appropriate diets, oral nutritional supplements, and vitamin/mineral supplements  - Order, calculate, and assess calorie counts as needed  - Recommend, monitor, and adjust tube feedings and TPN/PPN based on assessed needs  - Assess need for intravenous fluids  - Provide specific nutrition/hydration education as appropriate  - Include patient/family/caregiver in decisions related to nutrition  Outcome: Progressing     Problem: PAIN - ADULT  Goal: Verbalizes/displays adequate comfort level or baseline comfort level  Description: Interventions:  - Encourage patient to monitor pain and request assistance  - Assess pain using appropriate pain scale  - Administer analgesics based on type and severity of pain and evaluate response  - Implement non-pharmacological measures as appropriate and evaluate response  - Consider cultural and social influences on pain and pain management  - Notify physician/advanced practitioner if interventions unsuccessful or patient reports new pain  Outcome: Progressing     Problem: INFECTION - ADULT  Goal: Absence or prevention of progression during hospitalization  Description: INTERVENTIONS:  - Assess and monitor for signs and symptoms of infection  - Monitor lab/diagnostic results  - Monitor all insertion sites, i e  indwelling lines, tubes, and drains  - Monitor endotracheal if appropriate and nasal secretions for changes in amount and color  - [place] appropriate cooling/warming therapies per order  - Administer medications as ordered  - Instruct and encourage patient and family to use good hand hygiene technique  - Identify and instruct in appropriate isolation precautions for identified infection/condition  Outcome: Progressing     Problem: SAFETY ADULT  Goal: Patient will remain free of falls  Description: INTERVENTIONS:  - Educate patient/family on patient safety including physical limitations  - Instruct patient to call for assistance with activity   - Consult OT/PT to assist with strengthening/mobility   - Keep Call bell within reach  - Keep bed low and locked with side rails adjusted as appropriate  - Keep care items and personal belongings within reach  - Initiate and maintain comfort rounds  - Make Fall Risk Sign visible to staff  - Apply yellow socks and bracelet for high fall risk patients  - Consider moving patient to room near nurses station  Outcome: Progressing     Problem: DISCHARGE PLANNING  Goal: Discharge to home or other facility with appropriate resources  Description: INTERVENTIONS:  - Identify barriers to discharge w/patient and caregiver  - Arrange for needed discharge resources and transportation as appropriate  - Identify discharge learning needs (meds, wound care, etc )  - Arrange for interpretive services to assist at discharge as needed  - Refer to Case Management Department for coordinating discharge planning if the patient needs post-hospital services based on physician/advanced practitioner order or complex needs related to functional status, cognitive ability, or social support system  Outcome: Progressing     Problem: Knowledge Deficit  Goal: Patient/family/caregiver demonstrates understanding of disease process, treatment plan, medications, and discharge instructions  Description: Complete learning assessment and assess knowledge base    Interventions:  - Provide teaching at level of understanding  - Provide teaching via preferred learning methods  Outcome: Progressing

## 2023-04-15 NOTE — NURSING NOTE
"IV access lost due pt c/o discomfort  small amount of clear drainage noted during NS infusion  No s/s infiltration or Phlebitis noted  Pt refused to allow nurse to place new IV  Pt stated \"I will allow a new IV to be placed when my next IV antibiotic is due tonuight\"  Vibramycin IVPB scheduled for 2100  Charge nurse & SLIM made aware    "

## 2023-04-15 NOTE — PROGRESS NOTES
Progress Note - Infectious Disease   Ramya Becerra 56 y o  female MRN: [id number]  Unit/Bed#: -01 Encounter: [id number]      Impression/Plan:  1  Sepsis, developed over admission  Patient documented as having high fever along with tachycardia over the course of admission  She has been experiencing symptoms since the beginning of the month as an outpatient  Work-up including CT, UA, urine cultures and blood cultures have been unremarkable  Exam otherwise nonfocal   Would question given abnormalities below if patient may have a tick related illness or possibly mono  Parasite smears x2 negative  CMV IgG and IgM positive  Noninfectious causes include developing/occult hematologic malignancy  Follow-up final parasite smear  Continue azithromycin IV/atovaquone for now  Continue doxycycline IV for now  Follow-up pending Anaplasma, Ehrlichia, lyme and RMSF IgM testing  Follow-up EBV testing  Follow-up pending blood cultures  Follow-up baseline HIV/hepatitis testing  Repeat CBC and CMP ordered for tomorrow  Continue to trend fever curve/vitals  If fevers persist and work-up negative may need to consider peripheral flow cytometry  Additional supportive care as per primary  Additional interventions pending clinical course     2  Developing anemia and transaminitis and lymphocytosis  Patient's labs notable for low level transaminitis and developing anemia  On differential there is a lymphocytosis present as well  Parasite smear notable for atypical lymphocytosis  Differential as above    Repeat CBC and CMP tomorrow  Follow-up additional parasite smear  Antibiotics as above  Follow-up EBV testing  Possible flow cytometry/hematology evaluation if fails to improve  Monitor abdominal exam  Additional interventions pending clinical course     Above plan discussed in detail with the patient and with primary service      ID consult service will continue to follow  Antibiotics:  Doxycycline/atovaquone/azithromycin 3    24 Hour events:  Yesterday and overnight notes reviewed and no acute events noted  Subjective:  Patient was aware of fever last night  She otherwise is reporting some subjective improvement and reports some improvement in energy level  Denies having any nausea, vomiting, chest pain  We reviewed her test pending and resulted thus far  Objective:  Vitals:  Temp:  [98 3 °F (36 8 °C)-102 9 °F (39 4 °C)] 99 2 °F (37 3 °C)  HR:  [] 85  BP: ()/(57-75) 103/61  SpO2:  [93 %-95 %] 94 %  Temp (24hrs), Av 6 °F (37 6 °C), Min:98 3 °F (36 8 °C), Max:102 9 °F (39 4 °C)  Current: Temperature: 99 2 °F (37 3 °C)    Physical Exam:   General Appearance:  Alert, interactive, nontoxic, no acute distress  Throat: Oropharynx moist without lesions  Denies any prior sore throat  Lungs:   Clear to auscultation bilaterally; no wheezes, rhonchi or rales; respirations unlabored on room air   Heart:  RRR; no murmur, rub or gallop noted   Abdomen:   Soft, non-tender, non-distended, positive bowel sounds  Extremities: No clubbing, cyanosis or edema   Skin: No new rashes or lesions  No new draining wounds noted  Labs, Imaging, & Other studies:   All pertinent labs and imaging studies in PACS were personally reviewed as below    Results from last 7 days   Lab Units 04/15/23  0444 23  0446 23  1716 23  1113   WBC Thousand/uL 9 04 7 72  --  10 23*   HEMOGLOBIN g/dL 9 9* 10 5*  --  12 2   PLATELETS Thousands/uL 252 243 261 263     Results from last 7 days   Lab Units 04/15/23  0444 23  0446 23  1113   POTASSIUM mmol/L 3 5 3 7 3 5   CHLORIDE mmol/L 107 106 101   CO2 mmol/L 23 26 23   BUN mg/dL 8 9 10   CREATININE mg/dL 0 88 0 91 1 04   EGFR ml/min/1 73sq m 74 71 60   CALCIUM mg/dL 8 2* 8 5 9 0   AST U/L 98* 78* 82*   ALT U/L 80* 71* 81*   ALK PHOS U/L 108* 99 123*     Results from last 7 days   Lab Units 23  1122 04/13/23  1118 04/13/23  1113   BLOOD CULTURE  No Growth at 24 hrs   --  No Growth at 24 hrs  URINE CULTURE   --  No Growth <1000 cfu/mL  --        Lab interpretation/comments: LFTs remain elevated  Hemoglobin stable compared to yesterday  Platelets unremarkable  Parasite smear x2 negative  Third parasite smear preliminary possibly negative    CMV testing abnormal     Imaging interpretation/comments: No new images overnight    Culture data: Blood cultures remain without growth

## 2023-04-16 LAB
ALBUMIN SERPL BCP-MCNC: 2.9 G/DL (ref 3.5–5)
ALP SERPL-CCNC: 112 U/L (ref 34–104)
ALT SERPL W P-5'-P-CCNC: 76 U/L (ref 7–52)
ANION GAP SERPL CALCULATED.3IONS-SCNC: 7 MMOL/L (ref 4–13)
AST SERPL W P-5'-P-CCNC: 81 U/L (ref 13–39)
BILIRUB SERPL-MCNC: 1.03 MG/DL (ref 0.2–1)
BUN SERPL-MCNC: 7 MG/DL (ref 5–25)
CALCIUM ALBUM COR SERPL-MCNC: 9.4 MG/DL (ref 8.3–10.1)
CALCIUM SERPL-MCNC: 8.5 MG/DL (ref 8.4–10.2)
CHLORIDE SERPL-SCNC: 106 MMOL/L (ref 96–108)
CO2 SERPL-SCNC: 23 MMOL/L (ref 21–32)
CREAT SERPL-MCNC: 0.87 MG/DL (ref 0.6–1.3)
ERYTHROCYTE [DISTWIDTH] IN BLOOD BY AUTOMATED COUNT: 14.3 % (ref 11.6–15.1)
GFR SERPL CREATININE-BSD FRML MDRD: 75 ML/MIN/1.73SQ M
GLUCOSE SERPL-MCNC: 123 MG/DL (ref 65–140)
HCT VFR BLD AUTO: 29.4 % (ref 34.8–46.1)
HGB BLD-MCNC: 9.8 G/DL (ref 11.5–15.4)
MCH RBC QN AUTO: 29.3 PG (ref 26.8–34.3)
MCHC RBC AUTO-ENTMCNC: 33.3 G/DL (ref 31.4–37.4)
MCV RBC AUTO: 88 FL (ref 82–98)
PLATELET # BLD AUTO: 255 THOUSANDS/UL (ref 149–390)
PMV BLD AUTO: 10.1 FL (ref 8.9–12.7)
POTASSIUM SERPL-SCNC: 3.7 MMOL/L (ref 3.5–5.3)
PROT SERPL-MCNC: 6.2 G/DL (ref 6.4–8.4)
RBC # BLD AUTO: 3.35 MILLION/UL (ref 3.81–5.12)
SODIUM SERPL-SCNC: 136 MMOL/L (ref 135–147)
WBC # BLD AUTO: 9.48 THOUSAND/UL (ref 4.31–10.16)

## 2023-04-16 RX ORDER — DOXYCYCLINE HYCLATE 100 MG/1
100 CAPSULE ORAL EVERY 12 HOURS SCHEDULED
Status: DISCONTINUED | OUTPATIENT
Start: 2023-04-16 | End: 2023-04-18 | Stop reason: HOSPADM

## 2023-04-16 RX ADMIN — ACETAMINOPHEN 650 MG: 325 TABLET ORAL at 00:29

## 2023-04-16 RX ADMIN — DOXYCYCLINE 100 MG: 100 CAPSULE ORAL at 21:31

## 2023-04-16 RX ADMIN — DOXYCYCLINE 100 MG: 100 CAPSULE ORAL at 09:28

## 2023-04-16 RX ADMIN — ACETAMINOPHEN 650 MG: 325 TABLET ORAL at 16:23

## 2023-04-16 RX ADMIN — ATOVAQUONE 750 MG: 750 SUSPENSION ORAL at 05:15

## 2023-04-16 NOTE — ASSESSMENT & PLAN NOTE
· Suspect in the setting of infection, possibly ?dilutional contributing  · Follow up infectious work up  · No s/sxs of bleeding

## 2023-04-16 NOTE — PROGRESS NOTES
Progress Note - Infectious Disease   lEena Marcos 56 y o  female MRN: [id number]  Unit/Bed#: -01 Encounter: [id number]      Impression/Plan:  1   Sepsis, developed over admission   Patient documented as having high fever along with tachycardia over the course of admission  Chari Rod has been experiencing symptoms since the beginning of the month as an outpatient   Work-up including CT, UA, urine cultures and blood cultures have been unremarkable  Patient has now developed #3 over the course of admission  Differential includes tick related/rickettsial illness or possibly mono with either EBV/CMV, both tests abnormal   Parasite smears x3 are negative  HIV testing negative  Lyme testing negative  Noninfectious causes include developing/occult hematologic malignancy  Discontinue azithromycin/atovaquone  Switch to doxycycline p o  100 mg every 12 hours  Follow-up pending Anaplasma, Ehrlichia and RMSF IgM testing  Follow-up pending blood cultures  Follow-up baseline hepatitis testing  Repeat CBC and CMP ordered for tomorrow  Continue to trend fever curve/vitals  If fevers persist recommend discussion of case with hematology  Additional supportive care as per primary  Additional interventions pending clinical course     2   Developing anemia and transaminitis and lymphocytosis   Patient's labs notable for low level transaminitis and developing anemia   On differential there is a lymphocytosis present as well   Parasite smear notable for atypical lymphocytosis   Differential as above  Repeat CBC and CMP tomorrow  Antibiotics as above  Would discuss case with hematology if patient fails to improve  Monitor abdominal exam  Additional interventions pending clinical course    3  Rash  Patient on 4/15 overnight started to develop rash on her upper extremities and noted to have spread inwards and seen on the abdomen  Nonpruritic  Would question if patient may have a rickettsial illness as above    Antibiotics as above  Monitor rash for progression  Continue to trend fever curve/WBC  Monitor for tolerance to antibiotics otherwise    Above plan discussed in detail with the patient and with primary service      ID consult service will continue to follow      Antibiotics:  Doxycycline 4    24 Hour events:  Yesterday and overnight notes reviewed and there was issues with IV infiltration overnight and subsequent development of rash  Subjective:  Patient denies having any nausea, vomiting, chest pain or shortness of breath  She is subjectively feeling better  She feels unwell primarily when she does have fever  No further fevers thus far this morning  She is able to show me the rash but is unsure how the initial lesions progressed  She reports the rash itself is not itchy, painful  She does have discomfort where the IVs were an issue  Objective:  Vitals:  Temp:  [98 1 °F (36 7 °C)-101 1 °F (38 4 °C)] 98 1 °F (36 7 °C)  HR:  [76-95] 88  Resp:  [15-17] 17  BP: (114-118)/(58-67) 114/67  SpO2:  [93 %-96 %] 94 %  Temp (24hrs), Av 2 °F (37 3 °C), Min:98 1 °F (36 7 °C), Max:101 1 °F (38 4 °C)  Current: Temperature: 98 1 °F (36 7 °C)    Physical Exam:   General Appearance:  Alert, interactive, nontoxic, no acute distress  Throat: Oropharynx moist without lesions  Lungs:   Clear to auscultation bilaterally; no wheezes, rhonchi or rales; respirations unlabored on room air   Heart:  RRR; no murmur, rub or gallop noted   Abdomen:   Soft, non-tender, non-distended, positive bowel sounds  Extremities: No clubbing, cyanosis or edema   Skin:  Patient now has a diffuse macular, flat rash on her upper extremities, abdomen and legs  I do not appreciate any lesions on her palms and soles  The lesions themselves are nonblanchable and almost petechial in appearance  Labs, Imaging, & Other studies:   All pertinent labs and imaging studies in PACS were personally reviewed as below    Results from last 7 days   Lab Units 04/16/23  0535 04/15/23  0444 04/14/23  0446   WBC Thousand/uL 9 48 9 04 7 72   HEMOGLOBIN g/dL 9 8* 9 9* 10 5*   PLATELETS Thousands/uL 255 252 243     Results from last 7 days   Lab Units 04/16/23  0535 04/15/23  0444 04/14/23  0446   POTASSIUM mmol/L 3 7 3 5 3 7   CHLORIDE mmol/L 106 107 106   CO2 mmol/L 23 23 26   BUN mg/dL 7 8 9   CREATININE mg/dL 0 87 0 88 0 91   EGFR ml/min/1 73sq m 75 74 71   CALCIUM mg/dL 8 5 8 2* 8 5   AST U/L 81* 98* 78*   ALT U/L 76* 80* 71*   ALK PHOS U/L 112* 108* 99     Results from last 7 days   Lab Units 04/13/23  1123 04/13/23  1118 04/13/23  1113   BLOOD CULTURE  No Growth at 48 hrs  --  No Growth at 48 hrs  URINE CULTURE   --  No Growth <1000 cfu/mL  --        Lab interpretation/comments: LFTs are downtrending again  Ferritin noted to be elevated  Both EBV and CMV testing abnormal     Imaging interpretation/comments: No new images overnight  Culture data: Additional parasite smear negative  Blood cultures remain without growth

## 2023-04-16 NOTE — PLAN OF CARE
Problem: DISCHARGE PLANNING  Goal: Discharge to home or other facility with appropriate resources  Description: INTERVENTIONS:  - Identify barriers to discharge w/patient and caregiver  - Arrange for needed discharge resources and transportation as appropriate  - Identify discharge learning needs (meds, wound care, etc )  - Arrange for interpretive services to assist at discharge as needed  - Refer to Case Management Department for coordinating discharge planning if the patient needs post-hospital services based on physician/advanced practitioner order or complex needs related to functional status, cognitive ability, or social support system  Outcome: Progressing     Problem: SAFETY ADULT  Goal: Patient will remain free of falls  Description: INTERVENTIONS:  - Educate patient/family on patient safety including physical limitations  - Instruct patient to call for assistance with activity   - Consult OT/PT to assist with strengthening/mobility   - Keep Call bell within reach  - Keep bed low and locked with side rails adjusted as appropriate  - Keep care items and personal belongings within reach  - Initiate and maintain comfort rounds  - Make Fall Risk Sign visible to staff  - Offer Toileting every 2 Hours, in advance of need    - Obtain necessary fall risk management equipment:   - Apply yellow socks and bracelet for high fall risk patients  - Consider moving patient to room near nurses station  Outcome: Progressing

## 2023-04-16 NOTE — CONSULTS
Medical Oncology/Hematology Consult Note  Mary Rand, female, 56 y o , 1965,  /-[birth year], [id number]     Reason for admission: Fever  Reason for consultation: Fever of unknown origin, lymphocytosis      ASSESSMENT AND PLAN:     1  Fever of unknown origin  • Patient had abnormal CMV and EBV serologies, ongoing work-up per ID  • Suspect etiology less likely secondary to hematological/oncological process  • Recommend CT chest with contrast for further classification of opacity medial left lung base    2  Lymphocytosis  • New this admission, otherwise normal WBC improved today with absolute lymphocyte count being in normal ranges 3 96, no evidence of blasts  • For comprehensive purposes, we will order peripheral flow cytometry however fever reactive changes from underlying infectious etiology     3  Normocytic Anemia  • Normal hemoglobin on admission, has been downtrending question dilutional component - wll order retic, b12, folate, mma, homocysteine, direct bili, soluble transferrin receptor   • Elevated ferritin favor reactive changes however if persistent as outpatient recommend f/u hemochromatosis studies     4  Transaminitis   · Question relation to #1, f/u US RUQ although unremarkable CT abdomen pelvis, f/u repeat ferritin, ld, lipid panel    Patient understands and is in agreement with this plan  Thank you for the opportunity to participate in this patient's care  History of present illness: Patient is a 72-year-old female no significant past medical history who presented to emergency department for evaluation of monitoring for fever, malaise, more recent history of UTI for  4/6/2023 however follow-up urine cultures negative    More recent labs from 4/5/2023 elevated white blood cell count 1 19, hemoglobin 11 9, platelets 469 manual differential 1% atypical lymphocytes, otherwise normal differential   On admission on 4/13/2023 elevated WBC 10 23, resolution of thrombocytopenia, hemoglobin "12 2, manual differential ANC 4 30, absolute lymphocyte count 4 60, absolute monocytes 1 33  Path slide review no blood parasites seen, atypical lymphocytosis favored to be reactive and if persistent medical condition pathology recommends follow-up flow cytometry  Patient developed a rash for 15 overnight upper extremities, rickettsial serology pending  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  Family hx paternal grandmother lung cancer 46s was smoker, paternal aunt breast cancer 35s    [address] - socially   Tobacco - no smoking   Herbal or tylenol - no     Imaging:   CT abdomen pelvis w/contrast: mild splenomegaly, no acute abdominopelvic process, minimal sigmoid diverticulosis   VAS duplex unilateral, limited: no acute DVT, superficial thrombophlebitis   XR Chest pa and lateral: Subtle opacity in the medial left base on the frontal projection, while this could be due to atelectasis, pneumonia is not excluded in the appropriate clinical setting    Review of Systems:   Review of Systems   Constitutional: Negative for chills and fever  HENT: Negative for ear pain and sore throat  Eyes: Negative for pain and visual disturbance  Respiratory: Negative for cough and shortness of breath  Cardiovascular: Negative for chest pain and palpitations  Gastrointestinal: Negative for abdominal pain and vomiting  Genitourinary: Negative for dysuria and hematuria  Musculoskeletal: Negative for arthralgias and back pain  Skin: Negative for color change and rash  Neurological: Negative for seizures, syncope, facial asymmetry, light-headedness and headaches  All other systems reviewed and are negative  PHYSICAL EXAM:    /68   Pulse 90   Temp 98 9 °F (37 2 °C)   Resp 18   Ht 5' 6\" (1 676 m)   SpO2 94%   BMI 31 47 kg/m²     Physical Exam  Vitals reviewed  Constitutional:       General: She is not in acute distress  Appearance: Normal appearance   She is not " ill-appearing  HENT:      Head: Normocephalic and atraumatic  Eyes:      General: No scleral icterus  Cardiovascular:      Rate and Rhythm: Normal rate and regular rhythm  Heart sounds: Normal heart sounds  No murmur heard  Pulmonary:      Effort: Pulmonary effort is normal       Breath sounds: Normal breath sounds  No wheezing or rhonchi  Abdominal:      General: Abdomen is flat  Bowel sounds are normal       Palpations: Abdomen is soft  Tenderness: There is no abdominal tenderness  There is no guarding  Musculoskeletal:      Cervical back: Normal range of motion and neck supple  Right lower leg: No edema  Left lower leg: No edema  Lymphadenopathy:      Cervical: No cervical adenopathy  Skin:     General: Skin is warm and dry  Findings: Rash (b/l lower upper and lower extremities, sparing palms and soles) present  No bruising  Neurological:      Mental Status: She is alert and oriented to person, place, and time           LABS:     Recent Results (from the past 48 hour(s))   CBC and differential    Collection Time: 04/15/23  4:44 AM   Result Value Ref Range    WBC 9 04 4 31 - 10 16 Thousand/uL    RBC 3 45 (L) 3 81 - 5 12 Million/uL    Hemoglobin 9 9 (L) 11 5 - 15 4 g/dL    Hematocrit 30 6 (L) 34 8 - 46 1 %    MCV 89 82 - 98 fL    MCH 28 7 26 8 - 34 3 pg    MCHC 32 4 31 4 - 37 4 g/dL    RDW 14 0 11 6 - 15 1 %    MPV 10 2 8 9 - 12 7 fL    Platelets 730 405 - 202 Thousands/uL    nRBC 0 /100 WBCs    Neutrophils Relative 28 (L) 43 - 75 %    Immat GRANS % 0 0 - 2 %    Lymphocytes Relative 67 (H) 14 - 44 %    Monocytes Relative 3 (L) 4 - 12 %    Eosinophils Relative 1 0 - 6 %    Basophils Relative 1 0 - 1 %    Neutrophils Absolute 2 53 1 85 - 7 62 Thousands/µL    Immature Grans Absolute 0 03 0 00 - 0 20 Thousand/uL    Lymphocytes Absolute 6 08 (H) 0 60 - 4 47 Thousands/µL    Monocytes Absolute 0 30 0 17 - 1 22 Thousand/µL    Eosinophils Absolute 0 05 0 00 - 0 61 Thousand/µL Basophils Absolute 0 05 0 00 - 0 10 Thousands/µL   Comprehensive metabolic panel    Collection Time: 04/15/23  4:44 AM   Result Value Ref Range    Sodium 136 135 - 147 mmol/L    Potassium 3 5 3 5 - 5 3 mmol/L    Chloride 107 96 - 108 mmol/L    CO2 23 21 - 32 mmol/L    ANION GAP 6 4 - 13 mmol/L    BUN 8 5 - 25 mg/dL    Creatinine 0 88 0 60 - 1 30 mg/dL    Glucose 124 65 - 140 mg/dL    Calcium 8 2 (L) 8 4 - 10 2 mg/dL    Corrected Calcium 9 1 8 3 - 10 1 mg/dL    AST 98 (H) 13 - 39 U/L    ALT 80 (H) 7 - 52 U/L    Alkaline Phosphatase 108 (H) 34 - 104 U/L    Total Protein 6 3 (L) 6 4 - 8 4 g/dL    Albumin 2 9 (L) 3 5 - 5 0 g/dL    Total Bilirubin 1 03 (H) 0 20 - 1 00 mg/dL    eGFR 74 ml/min/1 73sq m   RAPID HIV 1/2 AB-AG COMBO for 15years old and above    Collection Time: 04/15/23  4:44 AM   Result Value Ref Range    Rapid HIV 1 AND 2 Non-Reactive Non-Reactive    HIV-1 P24 Ag Screen Non-Reactive Non-Reactive   Blood Parasite smear    Collection Time: 04/15/23  4:44 AM    Specimen: Arm, Left; Blood   Result Value Ref Range    % Parasitemia 0     Is Blood Parasite Present No No   Iron Saturation %    Collection Time: 04/15/23  4:44 AM   Result Value Ref Range    Iron Saturation 20 15 - 50 %    TIBC 199 (L) 250 - 450 ug/dL    Iron 39 (L) 50 - 170 ug/dL   Ferritin    Collection Time: 04/15/23  4:44 AM   Result Value Ref Range    Ferritin 1,125 (H) 8 - 388 ng/mL   CBC and differential    Collection Time: 04/16/23  5:35 AM   Result Value Ref Range    WBC 9 48 4 31 - 10 16 Thousand/uL    RBC 3 35 (L) 3 81 - 5 12 Million/uL    Hemoglobin 9 8 (L) 11 5 - 15 4 g/dL    Hematocrit 29 4 (L) 34 8 - 46 1 %    MCV 88 82 - 98 fL    MCH 29 3 26 8 - 34 3 pg    MCHC 33 3 31 4 - 37 4 g/dL    RDW 14 3 11 6 - 15 1 %    MPV 10 1 8 9 - 12 7 fL    Platelets 745 242 - 434 Thousands/uL   Comprehensive metabolic panel    Collection Time: 04/16/23  5:35 AM   Result Value Ref Range    Sodium 136 135 - 147 mmol/L    Potassium 3 7 3 5 - 5 3 mmol/L Chloride 106 96 - 108 mmol/L    CO2 23 21 - 32 mmol/L    ANION GAP 7 4 - 13 mmol/L    BUN 7 5 - 25 mg/dL    Creatinine 0 87 0 60 - 1 30 mg/dL    Glucose 123 65 - 140 mg/dL    Calcium 8 5 8 4 - 10 2 mg/dL    Corrected Calcium 9 4 8 3 - 10 1 mg/dL    AST 81 (H) 13 - 39 U/L    ALT 76 (H) 7 - 52 U/L    Alkaline Phosphatase 112 (H) 34 - 104 U/L    Total Protein 6 2 (L) 6 4 - 8 4 g/dL    Albumin 2 9 (L) 3 5 - 5 0 g/dL    Total Bilirubin 1 03 (H) 0 20 - 1 00 mg/dL    eGFR 75 ml/min/1 73sq m       XR chest 1 view portable    Result Date: 4/5/2023  Narrative: CHEST INDICATION:   flu symptoms  COMPARISON:  None EXAM PERFORMED/VIEWS:  XR CHEST PORTABLE FINDINGS: Cardiomediastinal silhouette appears unremarkable  The lungs are clear  No pneumothorax or pleural effusion  Osseous structures appear within normal limits for patient age  Impression: No acute cardiopulmonary disease  Workstation performed: [id number]     XR chest pa & lateral    Result Date: 4/13/2023  Narrative: CHEST INDICATION:   fever  COMPARISON:  CXR 4/5/2023  EXAM PERFORMED/VIEWS:  XR CHEST PA & LATERAL  FINDINGS: Cardiomediastinal silhouette normal  Subtle opacity in the medial left base on the frontal projection  No effusion or pneumothorax  Upper abdomen normal  Bones normal for age  Impression: Subtle opacity in the medial left base on the frontal projection  While this could be due to atelectasis, pneumonia is not excluded in the appropriate clinical setting  The study was marked in [facility] for immediate notification  Workstation performed: [id number]     VAS lower limb venous duplex study, unilateral/limited    Result Date: 4/13/2023  Narrative:  [facility] REPORT CLINICAL: Indications: Patient presents with right lower extremity pain and fever x few days   Operative History: No prior cardiovascular surgeries Risk Factors: No cardiovascular risk factors   CONCLUSION:  Impression:  RIGHT LOWER LIMB No evidence of acute or chronic deep vein thrombosis  No evidence of superficial thrombophlebitis noted  No evidence of valvular incompetence noted in the deep veins  Popliteal, posterior tibial and anterior tibial arterial Doppler waveforms are triphasic  LEFT LOWER LIMB LIMITED Evaluation shows no evidence of thrombus in the common femoral vein  Doppler evaluation shows a normal response to augmentation maneuvers  SIGNATURE: Electronically Signed by: Radha Acevedo DO on 2023-04-13 10:14:39 PM    CT abdomen pelvis with contrast    Result Date: 4/13/2023  Narrative: CT ABDOMEN AND PELVIS WITH IV CONTRAST INDICATION:   fever, uti, flank pain  COMPARISON:  None  TECHNIQUE:  CT examination of the abdomen and pelvis was performed  Multiplanar 2D reformatted images were created from the source data  Radiation dose length product (DLP) for this visit:  725 mGy-cm   This examination, like all CT scans performed in the [facility], was performed utilizing techniques to minimize radiation dose exposure, including the use of iterative reconstruction and automated exposure control  IV Contrast:  100 mL of iohexol (OMNIPAQUE)  350 Enteric Contrast:  Enteric contrast was not administered  FINDINGS: ABDOMEN LOWER CHEST:  No clinically significant abnormality identified in the visualized lower chest  LIVER/BILIARY TREE:  Unremarkable  GALLBLADDER:  No calcified gallstones  No pericholecystic inflammatory change  SPLEEN:  Mild splenomegaly measuring 14 5 cm AP  PANCREAS:  Unremarkable  ADRENAL GLANDS:  Unremarkable  KIDNEYS/URETERS:  Unremarkable  No hydronephrosis  STOMACH AND BOWEL:  Mild sigmoid diverticulosis without diverticulitis  No bowel obstruction  APPENDIX:  A normal appendix was visualized  ABDOMINOPELVIC CAVITY:  No ascites  No pneumoperitoneum  No lymphadenopathy  VESSELS:  Aortoiliac calcification  No aneurysm  PELVIS REPRODUCTIVE ORGANS:  Unremarkable for patient's age  URINARY BLADDER:  Unremarkable   ABDOMINAL WALL/INGUINAL REGIONS:  Tiny fat-containing umbilical hernia  OSSEOUS STRUCTURES:  No acute fracture or osseous destructive lesion identified  Mild degenerative changes of the spine  Impression: No evidence of acute abdominopelvic process  Mild splenomegaly  Minimal sigmoid diverticulosis  Workstation performed: [id number]         HISTORY:    History reviewed  No pertinent past medical history  History reviewed  No pertinent surgical history  History reviewed  No pertinent family history      Social History     Socioeconomic History   • Marital status:      Spouse name: None   • Number of children: None   • Years of education: None   • Highest education level: None   Occupational History   • None   Tobacco Use   • Smoking status: Never   • Smokeless tobacco: None   Vaping Use   • Vaping Use: Never used   Substance and Sexual Activity   • Alcohol use: Yes     Comment: occational    • Drug use: No   • Sexual activity: None   Other Topics Concern   • None   Social History Narrative   • None     Social Determinants of Health     Financial Resource Strain: Not on file   Food Insecurity: Not on file   Transportation Needs: Not on file   Physical Activity: Not on file   Stress: Not on file   Social Connections: Not on file   Intimate Partner Violence: Not on file   Housing Stability: Not on file         Current Facility-Administered Medications:   •  acetaminophen (TYLENOL) tablet 650 mg, 650 mg, Oral, Q6H PRN, Jostin Mera MD, 650 mg at 04/16/23 1623  •  diphenhydrAMINE (BENADRYL) tablet 25 mg, 25 mg, Oral, Q6H PRN, Jostin Mera MD  •  doxycycline hyclate (VIBRAMYCIN) capsule 100 mg, 100 mg, Oral, Q12H [address], Caridad Bejarano MD, 100 mg at 04/16/23 7057  •  enoxaparin (LOVENOX) subcutaneous injection 40 mg, 40 mg, Subcutaneous, Daily, Dylon Velez MD, 40 mg at 04/14/23 1108  •  ondansetron (ZOFRAN) injection 4 mg, 4 mg, Intravenous, Q6H PRN, Dylon Velez MD    Medications Prior to Admission   Medication   • [] cephalexin (KEFLEX) 500 mg capsule   • ciprofloxacin (CIPRO) 500 mg tablet   • ondansetron (Zofran ODT) 4 mg disintegrating tablet       Allergies   Allergen Reactions   • Sulfa Antibiotics Other (See Comments)     Drops WBC        Labs and pertinent reports reviewed  This note has been generated by voice recognition software system  Therefore, there may be spelling, grammar, and or syntax errors  Please contact if questions arise

## 2023-04-16 NOTE — ASSESSMENT & PLAN NOTE
· Mildly elevated AST and ALT  · Likely secondary to viral or parasite process  · Monitor liver enzymes  · Check US RUQ

## 2023-04-16 NOTE — ASSESSMENT & PLAN NOTE
· Presented with intermittent fever up to 104 °F at home for the past 10 days  With leukopenia and thrombocytopenia on OP labs which has since resolved  · Was given Keflex a week ago and transitioned to Cipro few days ago for suspected UTI patient without resolution of fever and outpatient urine culture was negative  · Patient reports generalized body ache, headaches and bilateral lower extremities cramps  Denies any other symptoms  · Chest x-ray unremarkable  · CT abdomen/pelvis negative for acute pathology  · ED spoke with infectious disease who recommended tickborne illness work-up, EBV and CMV and empirically treat for tickborne illness and observe  · CMV (+)  · EBV (+): indicative of possible past infection however timing of infection cannot be certain  · Lyme unremarkable;  Follow-up tickborne illness blood work including Babesia, Anaplasma and Ehrlichia  · Continue with doxycycline PO  · Monitor fever curves and WBC counts  · Follow up final results of BC  · Dopplers negative for DVT  · If work up unrevealing and fever persists recommending peripheral flow cytometry, will consult Heme for further evaluation

## 2023-04-16 NOTE — PROGRESS NOTES
[address] [place] [address]  Progress Note  Name: Rose Das  MRN: [id number]  Unit/Bed#: -01 I Date of Admission: 4/13/2023   Date of Service: 4/16/2023 I Hospital Day: 2    Assessment/Plan   * Fever  Assessment & Plan  · Presented with intermittent fever up to 104 °F at home for the past 10 days  With leukopenia and thrombocytopenia on OP labs which has since resolved  · Was given Keflex a week ago and transitioned to Cipro few days ago for suspected UTI patient without resolution of fever and outpatient urine culture was negative  · Patient reports generalized body ache, headaches and bilateral lower extremities cramps  Denies any other symptoms  · Chest x-ray unremarkable  · CT abdomen/pelvis negative for acute pathology  · ED spoke with infectious disease who recommended tickborne illness work-up, EBV and CMV and empirically treat for tickborne illness and observe  · CMV (+)  · EBV (+): indicative of possible past infection however timing of infection cannot be certain  · Lyme unremarkable; Follow-up tickborne illness blood work including Babesia, Anaplasma and Ehrlichia  · Continue with doxycycline PO  · Monitor fever curves and WBC counts  · Follow up final results of BC  · Dopplers negative for DVT  · If work up unrevealing and fever persists recommending peripheral flow cytometry, will consult Heme for further evaluation    Anemia  Assessment & Plan  · Suspect in the setting of infection, possibly ?dilutional contributing  · Follow up infectious work up  · No s/sxs of bleeding    Transaminitis  Assessment & Plan  · Mildly elevated AST and ALT  · Likely secondary to viral or parasite process  · Monitor liver enzymes  · Check US RUQ          VTE Pharmacologic Prophylaxis: VTE Score: 3 Moderate Risk (Score 3-4) - Pharmacological DVT Prophylaxis Ordered: enoxaparin (Lovenox)  Patient Centered Rounds: I performed bedside rounds with nursing staff today    Discussions with Specialists or Other Care Team Provider: case management    Education and Discussions with Family / Patient: Patient declined call to   Total Time Spent on Date of Encounter in care of patient: 45 minutes This time was spent on one or more of the following: performing physical exam; counseling and coordination of care; obtaining or reviewing history; documenting in the medical record; reviewing/ordering tests, medications or procedures; communicating with other healthcare professionals and discussing with patient's family/caregivers  Current Length of Stay: 2 day(s)  Current Patient Status: Inpatient   Certification Statement: The patient will continue to require additional inpatient hospital stay due to Heme evaluation, infectious work up  Discharge Plan: Anticipate discharge in 24-48 hrs to home  Code Status: Level 1 - Full Code    Subjective:   Reports some abdominal pain this morning however feels like she was just hungry  Noted rash all over since last night  Objective:     Vitals:   Temp (24hrs), Av °F (37 2 °C), Min:97 7 °F (36 5 °C), Max:101 1 °F (38 4 °C)    Temp:  [97 7 °F (36 5 °C)-101 1 °F (38 4 °C)] 97 7 °F (36 5 °C)  HR:  [76-95] 88  Resp:  [15-17] 17  BP: (114-118)/(58-67) 114/67  SpO2:  [93 %-96 %] 94 %  Body mass index is 31 47 kg/m²  Input and Output Summary (last 24 hours): Intake/Output Summary (Last 24 hours) at 2023 1407  Last data filed at 2023 0915  Gross per 24 hour   Intake 250 ml   Output 3 ml   Net 247 ml       Physical Exam:   Physical Exam  Vitals and nursing note reviewed  Constitutional:       General: She is not in acute distress  Appearance: She is not toxic-appearing  HENT:      Head: Normocephalic  Nose: Nose normal       Mouth/Throat:      Mouth: Mucous membranes are moist       Pharynx: Oropharynx is clear  Eyes:      Conjunctiva/sclera: Conjunctivae normal       Pupils: Pupils are equal, round, and reactive to light  Cardiovascular:      Rate and Rhythm: Normal rate  Pulses: Normal pulses  Pulmonary:      Effort: Pulmonary effort is normal       Breath sounds: Normal breath sounds  No wheezing  Abdominal:      Palpations: Abdomen is soft  Tenderness: There is no abdominal tenderness  There is no guarding  Musculoskeletal:         General: Normal range of motion  Cervical back: Normal range of motion  Skin:     General: Skin is warm and dry  Findings: Rash present  Comments: (+) abrasion left knee   Neurological:      General: No focal deficit present  Mental Status: She is alert and oriented to person, place, and time  Mental status is at baseline  Psychiatric:         Mood and Affect: Mood normal          Thought Content: Thought content normal          Additional Data:     Labs:  Results from last 7 days   Lab Units 04/16/23  0535 04/15/23  0444 04/13/23  1716 04/13/23  1113   WBC Thousand/uL 9 48 9 04   < > 10 23*   HEMOGLOBIN g/dL 9 8* 9 9*   < > 12 2   HEMATOCRIT % 29 4* 30 6*   < > 35 7   PLATELETS Thousands/uL 255 252   < > 263   BANDS PCT %  --   --   --  1   NEUTROS PCT %  --  28*   < >  --    LYMPHS PCT %  --  67*   < >  --    LYMPHO PCT %  --   --   --  45*   MONOS PCT %  --  3*   < >  --    MONO PCT %  --   --   --  13*   EOS PCT %  --  1   < > 0    < > = values in this interval not displayed       Results from last 7 days   Lab Units 04/16/23  0535   SODIUM mmol/L 136   POTASSIUM mmol/L 3 7   CHLORIDE mmol/L 106   CO2 mmol/L 23   BUN mg/dL 7   CREATININE mg/dL 0 87   ANION GAP mmol/L 7   CALCIUM mg/dL 8 5   ALBUMIN g/dL 2 9*   TOTAL BILIRUBIN mg/dL 1 03*   ALK PHOS U/L 112*   ALT U/L 76*   AST U/L 81*   GLUCOSE RANDOM mg/dL 123     Results from last 7 days   Lab Units 04/13/23  1113   INR  1 15             Results from last 7 days   Lab Units 04/14/23  0446 04/13/23  1337 04/13/23  1113   LACTIC ACID mmol/L  --  1 4 2 8*   PROCALCITONIN ng/ml 0 70*  --  0 78* Lines/Drains:  Invasive Devices     Peripheral Intravenous Line  Duration           Peripheral IV 04/15/23 Distal;Dorsal (posterior); Right Forearm <1 day                      Imaging: No pertinent imaging reviewed  Recent Cultures (last 7 days):   Results from last 7 days   Lab Units 04/13/23  1123 04/13/23  1118 04/13/23  1113   BLOOD CULTURE  No Growth at 48 hrs  --  No Growth at 48 hrs  URINE CULTURE   --  No Growth <1000 cfu/mL  --        Last 24 Hours Medication List:   Current Facility-Administered Medications   Medication Dose Route Frequency Provider Last Rate   • acetaminophen  650 mg Oral Q6H PRN Gagan Harris MD     • diphenhydrAMINE  25 mg Oral Q6H PRN Gagan Harris MD     • doxycycline hyclate  100 mg Oral Q12H [facility] Lyla Montenegro MD     • enoxaparin  40 mg Subcutaneous Daily Marcelo Mathews MD     • ondansetron  4 mg Intravenous Q6H PRN Marcelo Mathews MD          Today, Patient Was Seen By: Gagan Harris MD    **Please Note: This note may have been constructed using a voice recognition system  **

## 2023-04-16 NOTE — PLAN OF CARE
Problem: Potential for Falls  Goal: Patient will remain free of falls  Description: INTERVENTIONS:  - Educate patient/family on patient safety including physical limitations  - Instruct patient to call for assistance with activity   - Consult OT/PT to assist with strengthening/mobility   - Keep Call bell within reach  - Keep bed low and locked with side rails adjusted as appropriate  - Keep care items and personal belongings within reach  - Initiate and maintain comfort rounds  - Make Fall Risk Sign visible to staff  - Apply yellow socks and bracelet for high fall risk patients  - Consider moving patient to room near nurses station  Outcome: Progressing     Problem: Nutrition/Hydration-ADULT  Goal: Nutrient/Hydration intake appropriate for improving, restoring or maintaining nutritional needs  Description: Monitor and assess patient's nutrition/hydration status for malnutrition  Collaborate with interdisciplinary team and initiate plan and interventions as ordered  Monitor patient's weight and dietary intake as ordered or per policy  Utilize nutrition screening tool and intervene as necessary  Determine patient's food preferences and provide high-protein, high-caloric foods as appropriate       INTERVENTIONS:  - Monitor oral intake, urinary output, labs, and treatment plans  - Assess nutrition and hydration status and recommend course of action  - Evaluate amount of meals eaten  - Assist patient with eating if necessary   - Allow adequate time for meals  - Recommend/ encourage appropriate diets, oral nutritional supplements, and vitamin/mineral supplements  - Order, calculate, and assess calorie counts as needed  - Recommend, monitor, and adjust tube feedings and TPN/PPN based on assessed needs  - Assess need for intravenous fluids  - Provide specific nutrition/hydration education as appropriate  - Include patient/family/caregiver in decisions related to nutrition  Outcome: Progressing     Problem: PAIN - ADULT  Goal: Verbalizes/displays adequate comfort level or baseline comfort level  Description: Interventions:  - Encourage patient to monitor pain and request assistance  - Assess pain using appropriate pain scale  - Administer analgesics based on type and severity of pain and evaluate response  - Implement non-pharmacological measures as appropriate and evaluate response  - Consider cultural and social influences on pain and pain management  - Notify physician/advanced practitioner if interventions unsuccessful or patient reports new pain  Outcome: Progressing     Problem: INFECTION - ADULT  Goal: Absence or prevention of progression during hospitalization  Description: INTERVENTIONS:  - Assess and monitor for signs and symptoms of infection  - Monitor lab/diagnostic results  - Monitor all insertion sites, i e  indwelling lines, tubes, and drains  - Monitor endotracheal if appropriate and nasal secretions for changes in amount and color  - [place] appropriate cooling/warming therapies per order  - Administer medications as ordered  - Instruct and encourage patient and family to use good hand hygiene technique  - Identify and instruct in appropriate isolation precautions for identified infection/condition  Outcome: Progressing     Problem: SAFETY ADULT  Goal: Patient will remain free of falls  Description: INTERVENTIONS:  - Educate patient/family on patient safety including physical limitations  - Instruct patient to call for assistance with activity   - Consult OT/PT to assist with strengthening/mobility   - Keep Call bell within reach  - Keep bed low and locked with side rails adjusted as appropriate  - Keep care items and personal belongings within reach  - Initiate and maintain comfort rounds  - Make Fall Risk Sign visible to staff  - Apply yellow socks and bracelet for high fall risk patients  - Consider moving patient to room near nurses station  Outcome: Progressing     Problem: DISCHARGE PLANNING  Goal: Discharge to home or other facility with appropriate resources  Description: INTERVENTIONS:  - Identify barriers to discharge w/patient and caregiver  - Arrange for needed discharge resources and transportation as appropriate  - Identify discharge learning needs (meds, wound care, etc )  - Arrange for interpretive services to assist at discharge as needed  - Refer to Case Management Department for coordinating discharge planning if the patient needs post-hospital services based on physician/advanced practitioner order or complex needs related to functional status, cognitive ability, or social support system  Outcome: Progressing     Problem: Knowledge Deficit  Goal: Patient/family/caregiver demonstrates understanding of disease process, treatment plan, medications, and discharge instructions  Description: Complete learning assessment and assess knowledge base    Interventions:  - Provide teaching at level of understanding  - Provide teaching via preferred learning methods  Outcome: Progressing

## 2023-04-17 ENCOUNTER — APPOINTMENT (INPATIENT)
Dept: ULTRASOUND IMAGING | Facility: HOSPITAL | Age: 58
End: 2023-04-17

## 2023-04-17 LAB
% PARASITEMIA: 0
A PHAGOCYTOPH DNA BLD QL NAA+PROBE: NEGATIVE
A PHAGOCYTOPH IGG TITR SER IF: NEGATIVE {TITER}
A PHAGOCYTOPH IGM TITR SER IF: NEGATIVE {TITER}
ALBUMIN SERPL BCP-MCNC: 2.9 G/DL (ref 3.5–5)
ALP SERPL-CCNC: 111 U/L (ref 34–104)
ALT SERPL W P-5'-P-CCNC: 69 U/L (ref 7–52)
ANION GAP SERPL CALCULATED.3IONS-SCNC: 6 MMOL/L (ref 4–13)
ANISOCYTOSIS BLD QL SMEAR: PRESENT
AST SERPL W P-5'-P-CCNC: 75 U/L (ref 13–39)
BASOPHILS # BLD MANUAL: 0 THOUSAND/UL (ref 0–0.1)
BASOPHILS NFR MAR MANUAL: 0 % (ref 0–1)
BILIRUB DIRECT SERPL-MCNC: 0.25 MG/DL (ref 0–0.2)
BILIRUB SERPL-MCNC: 1.05 MG/DL (ref 0.2–1)
BLASTS NFR BLD MANUAL: 1 %
BUN SERPL-MCNC: 8 MG/DL (ref 5–25)
CALCIUM ALBUM COR SERPL-MCNC: 9.4 MG/DL (ref 8.3–10.1)
CALCIUM SERPL-MCNC: 8.5 MG/DL (ref 8.4–10.2)
CHLORIDE SERPL-SCNC: 106 MMOL/L (ref 96–108)
CHOLEST SERPL-MCNC: 119 MG/DL
CO2 SERPL-SCNC: 23 MMOL/L (ref 21–32)
CREAT SERPL-MCNC: 0.83 MG/DL (ref 0.6–1.3)
E CHAFFEENSIS IGG TITR SER IF: NEGATIVE {TITER}
E CHAFFEENSIS IGM TITR SER IF: NEGATIVE {TITER}
EOSINOPHIL # BLD MANUAL: 0.1 THOUSAND/UL (ref 0–0.4)
EOSINOPHIL NFR BLD MANUAL: 1 % (ref 0–6)
ERYTHROCYTE [DISTWIDTH] IN BLOOD BY AUTOMATED COUNT: 14.4 % (ref 11.6–15.1)
FERRITIN SERPL-MCNC: 947 NG/ML (ref 8–388)
FOLATE SERPL-MCNC: 12.9 NG/ML (ref 3.1–17.5)
GFR SERPL CREATININE-BSD FRML MDRD: 79 ML/MIN/1.73SQ M
GLUCOSE SERPL-MCNC: 122 MG/DL (ref 65–140)
HCT VFR BLD AUTO: 29 % (ref 34.8–46.1)
HCYS SERPL-SCNC: 9.1 UMOL/L (ref 3.7–11.2)
HDLC SERPL-MCNC: 12 MG/DL
HGB BLD-MCNC: 9.7 G/DL (ref 11.5–15.4)
HYPERCHROMIA BLD QL SMEAR: PRESENT
LDH SERPL-CCNC: 519 U/L (ref 140–271)
LDLC SERPL CALC-MCNC: 53 MG/DL (ref 0–100)
LG PLATELETS BLD QL SMEAR: PRESENT
LYMPHOCYTES # BLD AUTO: 3.96 THOUSAND/UL (ref 0.6–4.47)
LYMPHOCYTES # BLD AUTO: 41 % (ref 14–44)
MCH RBC QN AUTO: 29.1 PG (ref 26.8–34.3)
MCHC RBC AUTO-ENTMCNC: 33.4 G/DL (ref 31.4–37.4)
MCV RBC AUTO: 87 FL (ref 82–98)
MONOCYTES # BLD AUTO: 0.19 THOUSAND/UL (ref 0–1.22)
MONOCYTES NFR BLD: 2 % (ref 4–12)
NEUTROPHILS # BLD MANUAL: 2.8 THOUSAND/UL (ref 1.85–7.62)
NEUTS SEG NFR BLD AUTO: 29 % (ref 43–75)
NONHDLC SERPL-MCNC: 107 MG/DL
PARASITE BLD: NO
PATHOLOGIST INTERPRETATION: NORMAL
PLATELET # BLD AUTO: 237 THOUSANDS/UL (ref 149–390)
PLATELET BLD QL SMEAR: ADEQUATE
PMV BLD AUTO: 9.9 FL (ref 8.9–12.7)
POLYCHROMASIA BLD QL SMEAR: PRESENT
POTASSIUM SERPL-SCNC: 3.7 MMOL/L (ref 3.5–5.3)
PROT SERPL-MCNC: 6.2 G/DL (ref 6.4–8.4)
RBC # BLD AUTO: 3.33 MILLION/UL (ref 3.81–5.12)
RBC MORPH BLD: PRESENT
RETICS # AUTO: ABNORMAL 10*3/UL (ref 14097–95744)
RETICS # CALC: 2.86 % (ref 0.37–1.87)
SODIUM SERPL-SCNC: 135 MMOL/L (ref 135–147)
TRIGL SERPL-MCNC: 270 MG/DL
VARIANT LYMPHS # BLD AUTO: 26 %
VIT B12 SERPL-MCNC: 547 PG/ML (ref 100–900)
WBC # BLD AUTO: 9.67 THOUSAND/UL (ref 4.31–10.16)

## 2023-04-17 RX ADMIN — DOXYCYCLINE 100 MG: 100 CAPSULE ORAL at 10:38

## 2023-04-17 RX ADMIN — DOXYCYCLINE 100 MG: 100 CAPSULE ORAL at 21:35

## 2023-04-17 RX ADMIN — ACETAMINOPHEN 650 MG: 325 TABLET ORAL at 20:28

## 2023-04-17 NOTE — ASSESSMENT & PLAN NOTE
· Mildly elevated AST and ALT  · Likely secondary to viral or parasite process  · Monitor liver enzymes  · Follow up [address] for completion

## 2023-04-17 NOTE — CASE MANAGEMENT
Case Management Discharge Planning Note    Patient name Joanna Harris  Location [facility] [address] [id number]/-90 MRN [id number]  : 1965 Date 2023       Current Admission Date: 2023  Current Admission Diagnosis:Fever   Patient Active Problem List    Diagnosis Date Noted   • Anemia 04/15/2023   • Fever 2023   • Transaminitis 2023   • Other allergic rhinitis 2021   • Regular astigmatism of both eyes 2020   • Dyslipidemia, goal to be determined 12/15/2009   • Secondary thrombocytopenia 2009      LOS (days): 3  Geometric Mean LOS (GMLOS) (days): 2 60  Days to GMLOS:-0 6     OBJECTIVE:  Risk of Unplanned Readmission Score: 10 66         Current admission status: Inpatient   Preferred Pharmacy:   [address] [place], [place] - [address]  Phone: [phone] Fax: [phone]    Primary Care Provider: Derek Mckee DO    Primary Insurance: [address] [place] [address]  Secondary Insurance:     DISCHARGE DETAILS:                                          Other Referral/Resources/Interventions Provided:  Referral Comments: Pt admitted for fevers x 10 days w random rash  Parasitic w/u in progress  Heme/onc cs, IV abx>po abx, IVF at 100  D/C anticipated in 24 hrs  CM will follow for post acute needs  Treatment Team Recommendation: Home  Discharge Destination Plan[de-identified] Home  Transport at Discharge :  Other (Comment) (TBD)

## 2023-04-17 NOTE — PLAN OF CARE
Problem: Potential for Falls  Goal: Patient will remain free of falls  Description: INTERVENTIONS:  - Educate patient/family on patient safety including physical limitations  - Instruct patient to call for assistance with activity   - Consult OT/PT to assist with strengthening/mobility   - Keep Call bell within reach  - Keep bed low and locked with side rails adjusted as appropriate  - Keep care items and personal belongings within reach  - Initiate and maintain comfort rounds  - Make Fall Risk Sign visible to staff  - Apply yellow socks and bracelet for high fall risk patients  - Consider moving patient to room near nurses station  Outcome: Progressing     Problem: Nutrition/Hydration-ADULT  Goal: Nutrient/Hydration intake appropriate for improving, restoring or maintaining nutritional needs  Description: Monitor and assess patient's nutrition/hydration status for malnutrition  Collaborate with interdisciplinary team and initiate plan and interventions as ordered  Monitor patient's weight and dietary intake as ordered or per policy  Utilize nutrition screening tool and intervene as necessary  Determine patient's food preferences and provide high-protein, high-caloric foods as appropriate       INTERVENTIONS:  - Monitor oral intake, urinary output, labs, and treatment plans  - Assess nutrition and hydration status and recommend course of action  - Evaluate amount of meals eaten  - Assist patient with eating if necessary   - Allow adequate time for meals  - Recommend/ encourage appropriate diets, oral nutritional supplements, and vitamin/mineral supplements  - Order, calculate, and assess calorie counts as needed  - Recommend, monitor, and adjust tube feedings and TPN/PPN based on assessed needs  - Assess need for intravenous fluids  - Provide specific nutrition/hydration education as appropriate  - Include patient/family/caregiver in decisions related to nutrition  Outcome: Progressing     Problem: PAIN - ADULT  Goal: Verbalizes/displays adequate comfort level or baseline comfort level  Description: Interventions:  - Encourage patient to monitor pain and request assistance  - Assess pain using appropriate pain scale  - Administer analgesics based on type and severity of pain and evaluate response  - Implement non-pharmacological measures as appropriate and evaluate response  - Consider cultural and social influences on pain and pain management  - Notify physician/advanced practitioner if interventions unsuccessful or patient reports new pain  Outcome: Progressing     Problem: INFECTION - ADULT  Goal: Absence or prevention of progression during hospitalization  Description: INTERVENTIONS:  - Assess and monitor for signs and symptoms of infection  - Monitor lab/diagnostic results  - Monitor all insertion sites, i e  indwelling lines, tubes, and drains  - Monitor endotracheal if appropriate and nasal secretions for changes in amount and color  - [place] appropriate cooling/warming therapies per order  - Administer medications as ordered  - Instruct and encourage patient and family to use good hand hygiene technique  - Identify and instruct in appropriate isolation precautions for identified infection/condition  Outcome: Progressing     Problem: SAFETY ADULT  Goal: Patient will remain free of falls  Description: INTERVENTIONS:  - Educate patient/family on patient safety including physical limitations  - Instruct patient to call for assistance with activity   - Consult OT/PT to assist with strengthening/mobility   - Keep Call bell within reach  - Keep bed low and locked with side rails adjusted as appropriate  - Keep care items and personal belongings within reach  - Initiate and maintain comfort rounds  - Make Fall Risk Sign visible to staff  - Apply yellow socks and bracelet for high fall risk patients  - Consider moving patient to room near nurses station  Outcome: Progressing     Problem: DISCHARGE PLANNING  Goal: Discharge to home or other facility with appropriate resources  Description: INTERVENTIONS:  - Identify barriers to discharge w/patient and caregiver  - Arrange for needed discharge resources and transportation as appropriate  - Identify discharge learning needs (meds, wound care, etc )  - Arrange for interpretive services to assist at discharge as needed  - Refer to Case Management Department for coordinating discharge planning if the patient needs post-hospital services based on physician/advanced practitioner order or complex needs related to functional status, cognitive ability, or social support system  Outcome: Progressing     Problem: Knowledge Deficit  Goal: Patient/family/caregiver demonstrates understanding of disease process, treatment plan, medications, and discharge instructions  Description: Complete learning assessment and assess knowledge base    Interventions:  - Provide teaching at level of understanding  - Provide teaching via preferred learning methods  Outcome: Progressing

## 2023-04-17 NOTE — PROGRESS NOTES
[facility]  Progress Note  Name: Sherely Orta  MRN: [id number]  Unit/Bed#: -01 I Date of Admission: 4/13/2023   Date of Service: 4/17/2023 I Hospital Day: 3    Assessment/Plan   * Fever  Assessment & Plan  · Presented with intermittent fever up to 104 °F at home for the past 10 days  With leukopenia and thrombocytopenia on OP labs which has since resolved  · Was given Keflex a week ago and transitioned to Cipro few days ago for suspected UTI patient without resolution of fever and outpatient urine culture was negative  · Patient reports generalized body ache, headaches and bilateral lower extremities cramps  Denies any other symptoms  · Chest x-ray unremarkable  · CT abdomen/pelvis negative for acute pathology  · ED spoke with infectious disease who recommended tickborne illness work-up, EBV and CMV and empirically treat for tickborne illness and observe  · CMV (+)  · EBV (+): indicative of possible past infection however timing of infection cannot be certain  · Lyme unremarkable; Follow-up tickborne illness blood work including Babesia, Anaplasma and Ehrlichia  · Continue with doxycycline PO  · Monitor fever curves and WBC counts  · Follow up final results of BC  · Dopplers negative for DVT  · If work up unrevealing and fever persists recommending peripheral flow cytometry, will consult Heme for further evaluation    Anemia  Assessment & Plan  · Suspect in the setting of infection, possibly ?dilutional contributing  · Follow up infectious work up  · No s/sxs of bleeding    Transaminitis  Assessment & Plan  · Mildly elevated AST and ALT  · Likely secondary to viral or parasite process  · Monitor liver enzymes  · Follow up [address] for completion            VTE Pharmacologic Prophylaxis: VTE Score: 3 Moderate Risk (Score 3-4) - Pharmacological DVT Prophylaxis Ordered: enoxaparin (Lovenox)  Patient Centered Rounds: I performed bedside rounds with nursing staff today    Discussions with Specialists or Other Care Team Provider: ID, case management, Oncology    Education and Discussions with Family / Patient: discussed with patient, patient will update family  Total Time Spent on Date of Encounter in care of patient: 62 minutes This time was spent on one or more of the following: performing physical exam; counseling and coordination of care; obtaining or reviewing history; documenting in the medical record; reviewing/ordering tests, medications or procedures; communicating with other healthcare professionals and discussing with patient's family/caregivers  Current Length of Stay: 3 day(s)  Current Patient Status: Inpatient   Certification Statement: The patient will continue to require additional inpatient hospital stay due to follow up of fever, hematology evaluation  Discharge Plan: Anticipate discharge tomorrow to Home if continues to be stable and remains afebrile    Code Status: Level 1 - Full Code    Subjective:   Reports she feels much better today  Denies chest pain or shortness of breath  With no febrile episode, patient did endorse that her temperature usually runs higher at baseline  Objective:     Vitals:   Temp (24hrs), Av 4 °F (37 4 °C), Min:98 2 °F (36 8 °C), Max:100 7 °F (38 2 °C)    Temp:  [98 2 °F (36 8 °C)-100 7 °F (38 2 °C)] 100 7 °F (38 2 °C)  HR:  [84-93] 93  Resp:  [18] 18  BP: (106-113)/(65-68) 106/66  SpO2:  [93 %-95 %] 93 %  Body mass index is 31 47 kg/m²  Input and Output Summary (last 24 hours): Intake/Output Summary (Last 24 hours) at 2023 1526  Last data filed at 2023 1700  Gross per 24 hour   Intake 240 ml   Output --   Net 240 ml       Physical Exam:   Physical Exam  Vitals and nursing note reviewed  Constitutional:       General: She is not in acute distress  Appearance: She is not toxic-appearing  HENT:      Head: Normocephalic        Nose: Nose normal       Mouth/Throat:      Mouth: Mucous membranes are moist  Pharynx: Oropharynx is clear  Eyes:      Conjunctiva/sclera: Conjunctivae normal       Pupils: Pupils are equal, round, and reactive to light  Cardiovascular:      Rate and Rhythm: Normal rate  Pulses: Normal pulses  Pulmonary:      Effort: Pulmonary effort is normal       Breath sounds: Normal breath sounds  No wheezing  Abdominal:      Palpations: Abdomen is soft  Tenderness: There is no abdominal tenderness  There is no guarding  Musculoskeletal:         General: Normal range of motion  Cervical back: Normal range of motion  Skin:     General: Skin is warm and dry  Findings: Rash present  Comments: (+) abrasion left knee   Neurological:      General: No focal deficit present  Mental Status: She is alert and oriented to person, place, and time  Mental status is at baseline  Psychiatric:         Mood and Affect: Mood normal          Thought Content: Thought content normal        Additional Data:     Labs:  Results from last 7 days   Lab Units 04/17/23  0600 04/16/23  0535 04/15/23  0444 04/13/23  1716 04/13/23  1113   WBC Thousand/uL 9 67   < > 9 04   < > 10 23*   HEMOGLOBIN g/dL 9 7*   < > 9 9*   < > 12 2   HEMATOCRIT % 29 0*   < > 30 6*   < > 35 7   PLATELETS Thousands/uL 237   < > 252   < > 263   BANDS PCT %  --   --   --   --  1   NEUTROS PCT %  --   --  28*   < >  --    LYMPHS PCT %  --   --  67*   < >  --    LYMPHO PCT % 41  --   --   --  45*   MONOS PCT %  --   --  3*   < >  --    MONO PCT % 2*  --   --   --  13*   EOS PCT % 1  --  1   < > 0    < > = values in this interval not displayed       Results from last 7 days   Lab Units 04/17/23  0600   SODIUM mmol/L 135   POTASSIUM mmol/L 3 7   CHLORIDE mmol/L 106   CO2 mmol/L 23   BUN mg/dL 8   CREATININE mg/dL 0 83   ANION GAP mmol/L 6   CALCIUM mg/dL 8 5   ALBUMIN g/dL 2 9*   TOTAL BILIRUBIN mg/dL 1 05*   ALK PHOS U/L 111*   ALT U/L 69*   AST U/L 75*   GLUCOSE RANDOM mg/dL 122     Results from last 7 days   Lab Units 04/13/23  1113   INR  1 15             Results from last 7 days   Lab Units 04/14/23  0446 04/13/23  1337 04/13/23  1113   LACTIC ACID mmol/L  --  1 4 2 8*   PROCALCITONIN ng/ml 0 70*  --  0 78*       Lines/Drains:  Invasive Devices     Peripheral Intravenous Line  Duration           Peripheral IV 04/15/23 Distal;Dorsal (posterior); Right Forearm 1 day                      Imaging: No pertinent imaging reviewed  Recent Cultures (last 7 days):   Results from last 7 days   Lab Units 04/13/23  1123 04/13/23  1118 04/13/23  1113   BLOOD CULTURE  No Growth at 72 hrs   --  No Growth at 72 hrs  URINE CULTURE   --  No Growth <1000 cfu/mL  --        Last 24 Hours Medication List:   Current Facility-Administered Medications   Medication Dose Route Frequency Provider Last Rate   • acetaminophen  650 mg Oral Q6H PRN Allen Agrawal MD     • diphenhydrAMINE  25 mg Oral Q6H PRN Allen Agrawal MD     • doxycycline hyclate  100 mg Oral Q12H [address] Elroy Lee MD     • enoxaparin  40 mg Subcutaneous Daily Jayda John MD     • ondansetron  4 mg Intravenous Q6H PRN Jayda John MD          Today, Patient Was Seen By: Allen Agrawal MD    **Please Note: This note may have been constructed using a voice recognition system  **

## 2023-04-17 NOTE — PROGRESS NOTES
Progress Note - Infectious Disease   Ankit Pnealoza 56 y o  female MRN: [id number]  Unit/Bed#: -01 Encounter: [id number]      Impression/Plan:  1   Sepsis, developed over admission   Patient documented as having high fever along with tachycardia over the course of admission  Cesarsuzanne Pendleton has been experiencing symptoms since the beginning of the month as an outpatient   Work-up including CT, UA, urine cultures and blood cultures have been unremarkable  Patient has now developed #3 over the course of admission  Differential includes tick related/rickettsial illness or possibly mono with either EBV/CMV, both tests abnormal   Parasite smears x3 are negative  HIV/hepatitis testing negative  Lyme testing negative  Noninfectious causes include developing/occult hematologic malignancy although less likely after discussion with oncology and now progression to atypical lymphocytosis on CBC  Continue doxycycline p o  100 mg every 12 hours  Plan for total of 14 days of therapy through 4/27/2023  Follow-up pending Anaplasma, Ehrlichia and RMSF IgM testing, inpatient/outpatient  Follow-up pending blood cultures while admitted  Repeat CBC and CMP ordered for tomorrow  Can follow-up additional hematology testing as outpatient  Continue to trend fever curve/vitals  If patient remains afebrile for next 24 hours, can consider discharge  Patient will need follow-up with PCP after discharge  No formal follow-up required in the ID office  Additional supportive care as per primary     2   Developing anemia and transaminitis and lymphocytosis   Patient's labs notable for low level transaminitis and developing anemia   On differential there is a lymphocytosis present as well  Parasite smear notable for atypical lymphocytosis   Differential as above    Repeat CBC and CMP tomorrow  Antibiotics as above  Follow-up oncology work-up as outpatient  Follow-up right upper quadrant ultrasound  Monitor abdominal exam  Additional interventions pending clinical course     3   Rash  Patient on 4/15 overnight started to develop rash on her upper extremities and noted to have spread inwards and seen on the abdomen  Nonpruritic  Would question if patient may have a rickettsial illness as above or potentially mono  Improving today  Antibiotics as above  Monitor rash for progression  Continue to trend fever curve/WBC  Monitor for tolerance to antibiotics otherwise     Above plan discussed in detail with the patient, primary service and oncology advance practitioner      ID consult service will continue to follow      Antibiotics:  Doxycycline 5    24 Hour events:  Yesterday and overnight notes reviewed and no acute events noted    Subjective:  Patient again overall subjectively feeling better  Denies having any nausea, vomiting, chest pain or shortness of breath  Only noted with low-grade temperatures in the last 24 hours  We discussed hopeful plans for discharge tomorrow if she remains further without fevers and plans for empiric course of doxycycline  Oncology work-up was sent for completeness  Objective:  Vitals:  Temp:  [98 2 °F (36 8 °C)-99 7 °F (37 6 °C)] 99 7 °F (37 6 °C)  HR:  [84-90] 90  Resp:  [18] 18  BP: (106-113)/(65-68) 106/66  SpO2:  [94 %-95 %] 94 %  Temp (24hrs), Av 9 °F (37 2 °C), Min:98 2 °F (36 8 °C), Max:99 7 °F (37 6 °C)  Current: Temperature: 99 7 °F (37 6 °C)    Physical Exam:   General Appearance:  Alert, interactive, nontoxic, no acute distress  Throat: Oropharynx moist without lesions  Lungs:   Clear to auscultation bilaterally; no wheezes, rhonchi or rales; respirations unlabored on room air   Heart:  RRR; no murmur, rub or gallop noted   Abdomen:   Soft, non-tender, non-distended, positive bowel sounds  Extremities: No clubbing, cyanosis or edema   Skin: No new rashes or lesions  No new draining wounds noted  Patient's diffuse rash seems to be dissipating on evaluation today         Labs, Imaging, & Other studies:   All pertinent labs and imaging studies in PACS were personally reviewed as below  Results from last 7 days   Lab Units 04/17/23  0600 04/16/23  0535 04/15/23  0444   WBC Thousand/uL 9 67 9 48 9 04   HEMOGLOBIN g/dL 9 7* 9 8* 9 9*   PLATELETS Thousands/uL 237 255 252     Results from last 7 days   Lab Units 04/17/23  0600 04/16/23  0535 04/15/23  0444   POTASSIUM mmol/L 3 7 3 7 3 5   CHLORIDE mmol/L 106 106 107   CO2 mmol/L 23 23 23   BUN mg/dL 8 7 8   CREATININE mg/dL 0 83 0 87 0 88   EGFR ml/min/1 73sq m 79 75 74   CALCIUM mg/dL 8 5 8 5 8 2*   AST U/L 75* 81* 98*   ALT U/L 69* 76* 80*   ALK PHOS U/L 111* 112* 108*     Results from last 7 days   Lab Units 04/13/23  1123 04/13/23  1118 04/13/23  1113   BLOOD CULTURE  No Growth at 72 hrs   --  No Growth at 72 hrs  URINE CULTURE   --  No Growth <1000 cfu/mL  --        Lab interpretation/comments: LFTs slowly downtrending  Hemoglobin stable  Creatinine 0 8  Imaging interpretation/comments: None overnight    Culture data: Chronic hepatitis panel negative  Additional tick related testing still pending

## 2023-04-17 NOTE — PLAN OF CARE
Problem: Nutrition/Hydration-ADULT  Goal: Nutrient/Hydration intake appropriate for improving, restoring or maintaining nutritional needs  Description: Monitor and assess patient's nutrition/hydration status for malnutrition  Collaborate with interdisciplinary team and initiate plan and interventions as ordered  Monitor patient's weight and dietary in  Problem: INFECTION - ADULT  Goal: Absence or prevention of progression during hospitalization  Description: INTERVENTIONS:  - Assess and monitor for signs and symptoms of infection  - Monitor lab/diagnostic results  - Monitor all insertion sites, i e  indwelling lines, tubes, and drains  - Monitor endotracheal if appropriate and nasal secretions for changes in amount and color  - [place] appropriate cooling/warming therapies per order  - Administer medications as ordered  - Instruct and encourage patient and family to use good hand hygiene technique  - Identify and instruct in appropriate isolation precautions for identified infection/condition  Outcome: Progressing   take as ordered or per policy  Utilize nutrition screening tool and intervene as necessary  Determine patient's food preferences and provide high-protein, high-caloric foods as appropriate       INTERVENTIONS:  - Monitor oral intake, urinary output, labs, and treatment plans  - Assess nutrition and hydration status and recommend course of action  - Evaluate amount of meals eaten  - Assist patient with eating if necessary   - Allow adequate time for meals  - Recommend/ encourage appropriate diets, oral nutritional supplements, and vitamin/mineral supplements  - Order, calculate, and assess calorie counts as needed  - Recommend, monitor, and adjust tube feedings and TPN/PPN based on assessed needs  - Assess need for intravenous fluids  - Provide specific nutrition/hydration education as appropriate  - Include patient/family/caregiver in decisions related to nutrition  Outcome: Progressing     Problem: DISCHARGE PLANNING  Goal: Discharge to home or other facility with appropriate resources  Description: INTERVENTIONS:  - Identify barriers to discharge w/patient and caregiver  - Arrange for needed discharge resources and transportation as appropriate  - Identify discharge learning needs (meds, wound care, etc )  - Arrange for interpretive services to assist at discharge as needed  - Refer to Case Management Department for coordinating discharge planning if the patient needs post-hospital services based on physician/advanced practitioner order or complex needs related to functional status, cognitive ability, or social support system  Outcome: Progressing     Problem: Knowledge Deficit  Goal: Patient/family/caregiver demonstrates understanding of disease process, treatment plan, medications, and discharge instructions  Description: Complete learning assessment and assess knowledge base    Interventions:  - Provide teaching at level of understanding  - Provide teaching via preferred learning methods  Outcome: Progressing

## 2023-04-18 VITALS
HEART RATE: 99 BPM | OXYGEN SATURATION: 95 % | TEMPERATURE: 98 F | HEIGHT: 66 IN | BODY MASS INDEX: 31.47 KG/M2 | SYSTOLIC BLOOD PRESSURE: 116 MMHG | RESPIRATION RATE: 18 BRPM | DIASTOLIC BLOOD PRESSURE: 70 MMHG

## 2023-04-18 LAB
ALBUMIN SERPL BCP-MCNC: 2.7 G/DL (ref 3.5–5)
ALP SERPL-CCNC: 98 U/L (ref 34–104)
ALT SERPL W P-5'-P-CCNC: 57 U/L (ref 7–52)
ANION GAP SERPL CALCULATED.3IONS-SCNC: 6 MMOL/L (ref 4–13)
AST SERPL W P-5'-P-CCNC: 64 U/L (ref 13–39)
BACTERIA BLD CULT: NORMAL
BACTERIA BLD CULT: NORMAL
BASOPHILS # BLD AUTO: 0.05 THOUSANDS/ΜL (ref 0–0.1)
BASOPHILS NFR BLD AUTO: 1 % (ref 0–1)
BILIRUB SERPL-MCNC: 1.01 MG/DL (ref 0.2–1)
BUN SERPL-MCNC: 9 MG/DL (ref 5–25)
CALCIUM ALBUM COR SERPL-MCNC: 9.3 MG/DL (ref 8.3–10.1)
CALCIUM SERPL-MCNC: 8.3 MG/DL (ref 8.4–10.2)
CHLORIDE SERPL-SCNC: 105 MMOL/L (ref 96–108)
CO2 SERPL-SCNC: 25 MMOL/L (ref 21–32)
CREAT SERPL-MCNC: 0.77 MG/DL (ref 0.6–1.3)
EOSINOPHIL # BLD AUTO: 0.13 THOUSAND/ΜL (ref 0–0.61)
EOSINOPHIL NFR BLD AUTO: 2 % (ref 0–6)
ERYTHROCYTE [DISTWIDTH] IN BLOOD BY AUTOMATED COUNT: 14.6 % (ref 11.6–15.1)
GFR SERPL CREATININE-BSD FRML MDRD: 87 ML/MIN/1.73SQ M
GLUCOSE SERPL-MCNC: 120 MG/DL (ref 65–140)
HBV CORE AB SER QL: REACTIVE
HBV CORE IGM SER QL: ABNORMAL
HBV SURFACE AG SER QL: ABNORMAL
HCT VFR BLD AUTO: 28.7 % (ref 34.8–46.1)
HCV AB SER QL: ABNORMAL
HGB BLD-MCNC: 9.5 G/DL (ref 11.5–15.4)
IMM GRANULOCYTES # BLD AUTO: 0.02 THOUSAND/UL (ref 0–0.2)
IMM GRANULOCYTES NFR BLD AUTO: 0 % (ref 0–2)
LYMPHOCYTES # BLD AUTO: 5.47 THOUSANDS/ΜL (ref 0.6–4.47)
LYMPHOCYTES NFR BLD AUTO: 66 % (ref 14–44)
MCH RBC QN AUTO: 29.1 PG (ref 26.8–34.3)
MCHC RBC AUTO-ENTMCNC: 33.1 G/DL (ref 31.4–37.4)
MCV RBC AUTO: 88 FL (ref 82–98)
MONOCYTES # BLD AUTO: 0.31 THOUSAND/ΜL (ref 0.17–1.22)
MONOCYTES NFR BLD AUTO: 4 % (ref 4–12)
NEUTROPHILS # BLD AUTO: 2.15 THOUSANDS/ΜL (ref 1.85–7.62)
NEUTS SEG NFR BLD AUTO: 27 % (ref 43–75)
NRBC BLD AUTO-RTO: 0 /100 WBCS
PLATELET # BLD AUTO: 242 THOUSANDS/UL (ref 149–390)
PMV BLD AUTO: 10.2 FL (ref 8.9–12.7)
POTASSIUM SERPL-SCNC: 3.7 MMOL/L (ref 3.5–5.3)
PROT SERPL-MCNC: 6.2 G/DL (ref 6.4–8.4)
R RICKETTSI IGM SER QL IA: 0.35 INDEX (ref 0–0.89)
RBC # BLD AUTO: 3.27 MILLION/UL (ref 3.81–5.12)
SODIUM SERPL-SCNC: 136 MMOL/L (ref 135–147)
WBC # BLD AUTO: 8.13 THOUSAND/UL (ref 4.31–10.16)

## 2023-04-18 RX ORDER — DOXYCYCLINE HYCLATE 100 MG/1
100 CAPSULE ORAL EVERY 12 HOURS SCHEDULED
Qty: 18 CAPSULE | Refills: 0 | Status: SHIPPED | OUTPATIENT
Start: 2023-04-18 | End: 2023-04-27

## 2023-04-18 RX ADMIN — ENOXAPARIN SODIUM 40 MG: 40 INJECTION SUBCUTANEOUS at 09:03

## 2023-04-18 RX ADMIN — DOXYCYCLINE 100 MG: 100 CAPSULE ORAL at 09:03

## 2023-04-18 NOTE — UTILIZATION REVIEW
NOTIFICATION OF ADMISSION DISCHARGE   This is a Notification of Discharge from [facility]  Please be advised that this patient has been discharge from our facility  Below you will find the admission and discharge date and time including the patient’s disposition  UTILIZATION REVIEW CONTACT:  Morena Felton  Utilization   Network Utilization Review Department  Phone: [phone] x carefully listen to the prompts  All voicemails are confidential   Email: [email or web address]     ADMISSION INFORMATION  PRESENTATION DATE: 4/13/2023  9:50 AM  OBERVATION ADMISSION DATE: 04/13/23  INPATIENT ADMISSION DATE: 4/14/23 11:48 AM   DISCHARGE DATE: 4/18/2023  2:08 PM   DISPOSITION:Home/Self Care    IMPORTANT INFORMATION:  Send all requests for admission clinical reviews, approved or denied determinations and any other requests to dedicated fax number below belonging to the campus where the patient is receiving treatment   List of dedicated fax numbers:  [address] DENIALS (Administrative/Medical Necessity) [phone]   [address] (Maternity/NICU/Pediatrics) [phone]   [facility] [phone]   [place] [address] [phone]   [address] [phone]   [address] [facility] [phone] [address] [facility] [phone]   [address] [place] [address] 119 [phone]   [facility]  [phone] [address] [facility] [phone] [facility] [address] [facility] [phone]

## 2023-04-18 NOTE — ASSESSMENT & PLAN NOTE
· Suspect in the setting of infection, possibly ?dilutional contributing  · Follow up infectious work up out patient  · Repeat cbc in one week   · No s/sxs of bleeding

## 2023-04-18 NOTE — ASSESSMENT & PLAN NOTE
· Presented with intermittent fever up to 104 °F at home for the past 10 days  With leukopenia and thrombocytopenia on OP labs which has since resolved  · Was given Keflex a week ago and transitioned to Cipro few days ago for suspected UTI patient without resolution of fever and outpatient urine culture was negative  · Patient reports generalized body ache, headaches and bilateral lower extremities cramps  Denies any other symptoms  · Chest x-ray unremarkable  · CT abdomen/pelvis negative for acute pathology  · ED spoke with infectious disease who recommended tickborne illness work-up, EBV and CMV and empirically treat for tickborne illness and observe  · CMV (+)  · EBV (+): indicative of possible past infection however timing of infection cannot be certain  · Lyme unremarkable; Follow-up tickborne illness blood work including Babesia, Anaplasma and Ehrlichia  · Continue with doxycycline PO  · Monitor fever curves and WBC counts  · Follow up final results of BC  · Dopplers negative for DVT  · ID following patient is cleared for discharge can follow up with PCP for pending lab results  · Hematology saw her and have low suspicion for blood driven cancer but agree work up is reasonable  Can discharge follow up with PCP for results referral to office if needed  · Just prior to discharge hep B core ab came back positive   dw ID possible false positive recommend out patient follow up with PCP for repeat hep panel and viral load

## 2023-04-18 NOTE — PROGRESS NOTES
Progress Note - Infectious Disease   Wilmar Infante 56 y o  female MRN: [id number]  Unit/Bed#: -01 Encounter: [id number]      Impression/Plan:  1   Sepsis, developed over admission   Patient documented as having high fever along with tachycardia over the course of admission, overall improved   She has been experiencing symptoms since the beginning of the month as an outpatient   Work-up including CT, UA, urine cultures and blood cultures have been unremarkable   Patient has now developed #3 over the course of admission  Differential includes tick related/rickettsial illness or mono with either EBV/CMV, both tests abnormal   Parasite smears x3 are negative   HIV testing negative   Lyme testing negative  Noninfectious causes include developing/occult hematologic malignancy although less likely after discussion with oncology and now progression to atypical lymphocytosis on CBC  Tick related testing for the most part has been negative  Still consider other rickettsial organisms given #3  Continue doxycycline p o  100 mg every 12 hours  Drug administration reviewed in detail with the patient  Plan for total of 14 days of therapy through 4/27/2023  Recommend repeat CBC and CMP with PCP in 1 week  Recommend repeat hepatitis B testing below in 1 week  Can follow-up additional hematology testing as outpatient  Continue to trend fever curve/vitals while admitted  Patient will need follow-up with PCP after discharge  No formal follow-up required in the ID office  Additional supportive care as per primary     2   Developing anemia and transaminitis and lymphocytosis   Patient's labs notable for low level transaminitis and developing anemia   On differential there is a lymphocytosis present as well  Parasite smear notable for atypical lymphocytosis   Differential as above  Right upper quadrant ultrasound with fatty infiltration    Antibiotics as above  Follow-up oncology work-up as outpatient  Monitor abdominal exam  Follow-up with PCP in 1 week with repeat labs      3   Rash   Patient on 4/15 overnight started to develop rash on her upper extremities and noted to have spread inwards and seen on the abdomen   Nonpruritic   Would question if patient may have a rickettsial illness as above or potentially mono  Improving today  Antibiotics as above  Monitor rash for progression  Continue to trend fever curve/WBC  Monitor for tolerance to antibiotics otherwise     4  Positive hep B core antibody  Result was corrected later  Would question if this may be a false positive in the setting of mono  Patient without any risk factors  Continue antibiotic course as above  Recommend repeat hepatitis panel and viral load in 1 week with PCP  Above plan discussed in detail with the patient and primary service      ID consult service will continue to follow while admitted      Antibiotics:  Doxycycline 6    24 Hour events:  Yesterday and overnight notes reviewed and no acute events noted    Subjective:  Patient currently denies having any nausea, vomiting, chest pain or shortness of breath  Noted to have low-grade temperature yesterday  Rash seems to be dissipating on the arms but remains on the legs and abdomen  Contacted by nursing later that hepatitis serology was updated with positive core antibody  Objective:  Vitals:  Temp:  [98 °F (36 7 °C)-100 7 °F (38 2 °C)] 98 °F (36 7 °C)  HR:  [85-99] 99  Resp:  [15-18] 18  BP: (101-116)/(63-70) 116/70  SpO2:  [93 %-95 %] 95 %  Temp (24hrs), Av °F (37 2 °C), Min:98 °F (36 7 °C), Max:100 7 °F (38 2 °C)  Current: Temperature: 98 °F (36 7 °C)    Physical Exam:   General Appearance:  Alert, interactive, nontoxic, no acute distress  Throat: Oropharynx moist without lesions      Lungs:   Clear to auscultation bilaterally; no wheezes, rhonchi or rales; respirations unlabored on room air   Heart:  RRR; no murmur, rub or gallop noted   Abdomen:   Soft, non-tender, non-distended, positive bowel sounds  Extremities: No clubbing, cyanosis or edema   Skin: No new rashes or lesions  No new draining wounds noted  Again her rash is dissipating on the upper extremities  It remains however on the legs and abdomen  Labs, Imaging, & Other studies:   All pertinent labs and imaging studies in PACS were personally reviewed as below  Results from last 7 days   Lab Units 04/18/23  0533 04/17/23  0600 04/16/23  0535   WBC Thousand/uL 8 13 9 67 9 48   HEMOGLOBIN g/dL 9 5* 9 7* 9 8*   PLATELETS Thousands/uL 242 237 255     Results from last 7 days   Lab Units 04/18/23  0533 04/17/23  0600 04/16/23  0535   POTASSIUM mmol/L 3 7 3 7 3 7   CHLORIDE mmol/L 105 106 106   CO2 mmol/L 25 23 23   BUN mg/dL 9 8 7   CREATININE mg/dL 0 77 0 83 0 87   EGFR ml/min/1 73sq m 87 79 75   CALCIUM mg/dL 8 3* 8 5 8 5   AST U/L 64* 75* 81*   ALT U/L 57* 69* 76*   ALK PHOS U/L 98 111* 112*     Results from last 7 days   Lab Units 04/13/23  1123 04/13/23  1118 04/13/23  1113   BLOOD CULTURE  No Growth After 4 Days  --  No Growth After 4 Days  URINE CULTURE   --  No Growth <1000 cfu/mL  --        Lab interpretation/comments: LFTs continue to downtrend  Hemoglobin stable  Creatinine stable  Imaging interpretation/comments: No new images overnight    Culture data: Additional tick related testing including Anaplasma, Ehrlichia, Lyme, RMSF testing all negative  Again EBV and CMV testing positive

## 2023-04-18 NOTE — ASSESSMENT & PLAN NOTE
· Mildly elevated AST and ALT  · Likely secondary to viral or parasite process  · Improving  · Repeat cmp in one week   · US RUQ for completion findings suggestive of fatty liver

## 2023-04-18 NOTE — DISCHARGE SUMMARY
[facility]  Discharge- Baldev Flynn 1965, 56 y o  female MRN: [id number]  Unit/Bed#: -01 Encounter: [id number]  Primary Care Provider: Jennie Minaya DO   Date and time admitted to hospital: 4/13/2023  9:50 AM    * Fever  Assessment & Plan  · Presented with intermittent fever up to 104 °F at home for the past 10 days  With leukopenia and thrombocytopenia on OP labs which has since resolved  · Was given Keflex a week ago and transitioned to Cipro few days ago for suspected UTI patient without resolution of fever and outpatient urine culture was negative  · Patient reports generalized body ache, headaches and bilateral lower extremities cramps  Denies any other symptoms  · Chest x-ray unremarkable  · CT abdomen/pelvis negative for acute pathology  · ED spoke with infectious disease who recommended tickborne illness work-up, EBV and CMV and empirically treat for tickborne illness and observe  · CMV (+)  · EBV (+): indicative of possible past infection however timing of infection cannot be certain  · Lyme unremarkable; Follow-up tickborne illness blood work including Babesia, Anaplasma and Ehrlichia  · Continue with doxycycline PO  · Monitor fever curves and WBC counts  · Follow up final results of BC  · Dopplers negative for DVT  · ID following patient is cleared for discharge can follow up with PCP for pending lab results  · Hematology saw her and have low suspicion for blood driven cancer but agree work up is reasonable  Can discharge follow up with PCP for results referral to office if needed  · Just prior to discharge hep B core ab came back positive   dw ID possible false positive recommend out patient follow up with PCP for repeat hep panel and viral load    Anemia  Assessment & Plan  · Suspect in the setting of infection, possibly ?dilutional contributing  · Follow up infectious work up out patient  · Repeat cbc in one week   · No s/sxs of bleeding    Transaminitis  Assessment & Plan  · Mildly elevated AST and ALT  · Likely secondary to viral or parasite process  · Improving  · Repeat cmp in one week   · US RUQ for completion findings suggestive of fatty liver  Medical Problems     Resolved Problems  Date Reviewed: 4/18/2023   None       Discharging Physician / Practitioner: ANH Mckenna  PCP: Arianna Beltran DO  Admission Date:   Admission Orders (From admission, onward)     Ordered        04/14/23 1148  Inpatient Admission  Once            04/13/23 1610  Place in Observation  Once                      Discharge Date: 04/18/23    Consultations During Hospital Stay:  · Infectious disease  · Oncology hematology    Procedures Performed:   · None    Significant Findings / Test Results:   · Fever of unclear etiology  · Transaminitis  · Anemia    Incidental Findings:   · None    Test Results Pending at Discharge (will require follow up):   · flow cytometry  · Soluble transferrin receptors  · Methylmalonic acid     Outpatient Tests Requested:  · Chronic hepatitis panel CBC CMP    Complications: Fever unclear etiology    Reason for Admission: Fever unclear etiology no significant past medical history patient has been treating outpatient with her PCP due to intermittent fevers unclear etiology patient came to the ED for further work-up    Hospital Course:   Tito Arthur is a 56 y o  female patient who originally presented to the hospital on 4/13/2023 due to patient was admitted seen by infectious disease Lyme was negative patient had a developing rash worked up outpatient for about 1 month CT UA urine cultures blood cultures are just included tick related illnesses for the cellulitis with either EBV/CMV given both test are abnormal   Parasite smears are negative HIV is negative Lyme is negative  Given nonspecific unclear etiology patient reports that recommended to see hematology oncology was consulted for admission    Less likely that her "anemia was due to hematological perspective and agree with ongoing work-up and treatment antibiotic with Doxy  Patient did come back positive however think this could be a false positive and recommend a repeat testing in a week patient was overall recommended to complete a course of Doxy 100 mg every 12 hours through 4/27/2023 outpatient follow-up for hepatitis panel CBC CMP and to see her PCP and oncology  Please see above list of diagnoses and related plan for additional information  Condition at Discharge: good    Discharge Day Visit / Exam:   Subjective: Patient reports feeling tired but not significant complaints denies any fever and understands the need for further work-up with her PCP and oncology as an outpatient  Vitals: Blood Pressure: 116/70 (04/18/23 0750)  Pulse: 99 (04/18/23 0750)  Temperature: 98 °F (36 7 °C) (04/18/23 0750)  Temp Source: Oral (04/13/23 1530)  Respirations: 18 (04/18/23 0750)  Height: 5' 6\" (167 6 cm) (04/15/23 1247)  SpO2: 95 % (04/18/23 0750)  Exam:   Physical Exam  Vitals and nursing note reviewed  Constitutional:       General: She is not in acute distress  Appearance: She is well-developed  HENT:      Head: Normocephalic and atraumatic  Eyes:      Conjunctiva/sclera: Conjunctivae normal    Cardiovascular:      Rate and Rhythm: Normal rate and regular rhythm  Heart sounds: No murmur heard  Pulmonary:      Effort: Pulmonary effort is normal  No respiratory distress  Breath sounds: Normal breath sounds  Abdominal:      Palpations: Abdomen is soft  Tenderness: There is no abdominal tenderness  Musculoskeletal:         General: No swelling  Cervical back: Neck supple  Skin:     General: Skin is warm and dry  Capillary Refill: Capillary refill takes less than 2 seconds  Neurological:      Mental Status: She is alert and oriented to person, place, and time     Psychiatric:         Mood and Affect: Mood normal           Discussion " with Family: Patient declined call to   Discharge instructions/Information to patient and family:   See after visit summary for information provided to patient and family  Provisions for Follow-Up Care:  See after visit summary for information related to follow-up care and any pertinent home health orders  Disposition:   Home    Planned Readmission: None     Discharge Statement:  I spent 40 minutes discharging the patient  This time was spent on the day of discharge  I had direct contact with the patient on the day of discharge  Greater than 50% of the total time was spent examining patient, answering all patient questions, arranging and discussing plan of care with patient as well as directly providing post-discharge instructions  Additional time then spent on discharge activities  Discharge Medications:  See after visit summary for reconciled discharge medications provided to patient and/or family        **Please Note: This note may have been constructed using a voice recognition system**

## 2023-04-19 LAB
SCAN RESULT: NORMAL
STFR SERPL-SCNC: 22.9 NMOL/L (ref 12.2–27.3)

## 2023-04-21 LAB — METHYLMALONATE SERPL-SCNC: 194 NMOL/L (ref 0–378)

## 2023-05-05 ENCOUNTER — TELEPHONE (OUTPATIENT)
Dept: HEMATOLOGY ONCOLOGY | Facility: CLINIC | Age: 58
End: 2023-05-05

## 2023-05-05 NOTE — TELEPHONE ENCOUNTER
Appointment Change  Cancel, Reschedule, Change to Virtual      Who are you speaking with? Patient   If it is not the patient, are they listed on an active communication consent form? N/A   Which provider is the appointment scheduled with? Saloni Angelo PA-C   When is the appointment scheduled? Please list date and time 05/22 at 4:00pm   At which location is the appointment scheduled to take place[facility]   Was the appointment rescheduled or changed from an in person visit to a virtual visit? If so, please list the details of the change  06/29 at 3:00pm   What is the reason for the appointment change? Patient is unable to make appointment    Was [facility] transport scheduled for this visit? No   Does STAR transport need to be scheduled for the new visit (if applicable) No   Does the patient need an infusion appointment rescheduled? No   Does the patient have an infusion appointment scheduled? If so, when? No   Is the patient undergoing chemotherapy? No   Was the no-show policy reviewed for appointments being changed with less then 24 hours of notice?  No

## 2023-05-10 ENCOUNTER — TELEPHONE (OUTPATIENT)
Dept: GASTROENTEROLOGY | Facility: CLINIC | Age: 58
End: 2023-05-10

## 2023-05-10 RX ORDER — FERROUS SULFATE 325(65) MG
TABLET ORAL
COMMUNITY
Start: 2023-04-27

## 2023-05-10 RX ORDER — DOXYCYCLINE HYCLATE 100 MG/1
CAPSULE ORAL
COMMUNITY
Start: 2023-04-18 | End: 2023-05-15

## 2023-05-15 ENCOUNTER — CONSULT (OUTPATIENT)
Dept: GASTROENTEROLOGY | Facility: CLINIC | Age: 58
End: 2023-05-15

## 2023-05-15 VITALS
SYSTOLIC BLOOD PRESSURE: 109 MMHG | HEART RATE: 90 BPM | HEIGHT: 66 IN | OXYGEN SATURATION: 98 % | WEIGHT: 188 LBS | BODY MASS INDEX: 30.22 KG/M2 | DIASTOLIC BLOOD PRESSURE: 74 MMHG

## 2023-05-15 DIAGNOSIS — R79.89 ELEVATED LFTS: ICD-10-CM

## 2023-05-15 DIAGNOSIS — D64.9 ANEMIA, UNSPECIFIED TYPE: Primary | ICD-10-CM

## 2023-05-15 DIAGNOSIS — Z12.11 SCREENING FOR COLON CANCER: ICD-10-CM

## 2023-05-15 DIAGNOSIS — K76.0 FATTY LIVER: ICD-10-CM

## 2023-05-15 NOTE — PATIENT INSTRUCTIONS
Scheduled date of colonoscopy (as of today):8/3/23  Physician performing colonoscopy:Cresencio  Location of colonoscopy:[place]  Bowel prep reviewed with patient:oCnnor/Miralax  Instructions reviewed with patient by:Wilmar moy  Clearances:  none

## 2023-05-15 NOTE — PROGRESS NOTES
[facility] - Outpatient Consultation  Hector Roman 56 y o  female MRN: [id number]  Encounter: [id number]          ASSESSMENT AND PLAN:      1  Anemia, unspecified type  Acute onset last month while hospitalized for an infectious illness  Wirt to be secondary to tick-borne illness vs EBV/CMV  Labs from last week document resolution with normalization of the Hgb/Hct  She has no overt rectal bleeding and no history of anemia    2  Screening for colon cancer  This will be her first screening colonoscopy  Average risk    3  Elevated LFTs  4  Fatty liver  Chronic mild elevation with acute bump in numbers last month while hospitalized - likely secondary to infectious illness  US documents a fatty liver  ALT mildly elevated in March of 2022  Viral hepatitis workup negative  Will check US elastography    ______________________________________________________________________    HPI: 51-year-old female with no significant past medical history who presents for evaluation to schedule a colonoscopy  She reports that she has never had a colonoscopy  She was hospitalized last month at [facility] with a fever of unknown origin  She had extensive testing while there and it was determined that she had a tickborne illness versus a mono virus infection  Her symptoms have completely resolved and she is back to feeling well  While hospitalized it was noted that she was anemic  This was a new problem for her  She has no history of anemia  She denies any overt bleeding  Since being out of the hospital last week she had a repeat CBC which documented a normal hemoglobin  Her family doctor did start her on an iron supplement which she is taking twice a day  It was also noted that her liver enzymes were elevated  Viral hepatitis testing was performed and noted to be negative for any acute infections  Ultrasound did document a fatty liver  She reports that she has known about this    She had a "slight elevation in her ALT on routine labs last year  She reports that she drinks alcohol occasionally  She does not drink excessively  She has no family history of liver disease that she is aware of  She has no significant abdominal pain, nausea or vomiting  REVIEW OF SYSTEMS:    CONSTITUTIONAL: Denies any fever, chills, rigors, and weight loss  HEENT: No earache or tinnitus  Denies hearing loss or visual disturbances  CARDIOVASCULAR: No chest pain or palpitations  RESPIRATORY: Denies any cough, hemoptysis, shortness of breath or dyspnea on exertion  GASTROINTESTINAL: As noted in the History of Present Illness  GENITOURINARY: No problems with urination  Denies any hematuria or dysuria  NEUROLOGIC: No dizziness or vertigo, denies headaches  MUSCULOSKELETAL: Denies any muscle or joint pain  SKIN: Denies skin rashes or itching  ENDOCRINE: Denies excessive thirst  Denies intolerance to heat or cold  PSYCHOSOCIAL: Denies depression or anxiety  Denies any recent memory loss  Historical Information   Past Medical History:   Diagnosis Date   • Anemia    • Elevated liver enzymes    • Roby Barr virus infection      History reviewed  No pertinent surgical history  Social History   Social History     Substance and Sexual Activity   Alcohol Use Yes    Comment: occational      Social History     Substance and Sexual Activity   Drug Use No     Social History     Tobacco Use   Smoking Status Never   Smokeless Tobacco Not on file     History reviewed  No pertinent family history  Meds/Allergies       Current Outpatient Medications:   •  ferrous sulfate 325 (65 Fe) mg tablet    Allergies   Allergen Reactions   • Sulfa Antibiotics Other (See Comments)     Drops WBC            Objective     Blood pressure 109/74, pulse 90, height 5' 6\" (1 676 m), weight 85 3 kg (188 lb), SpO2 98 %  Body mass index is 30 34 kg/m²          PHYSICAL EXAM:      General Appearance:   Alert, cooperative, no distress " HEENT:   Normocephalic, atraumatic, anicteric      Neck:  Supple, symmetrical, trachea midline   Lungs:   Clear to auscultation bilaterally; no rales, rhonchi or wheezing; respirations unlabored    Heart[de-identified]   Regular rate and rhythm; no murmur, rub, or gallop  Abdomen:   Soft, non-tender, non-distended; normal bowel sounds; no masses, no organomegaly    Genitalia:   Deferred    Rectal:   Deferred    Extremities:  No cyanosis, clubbing or edema    Pulses:  2+ and symmetric    Skin:  No jaundice, rashes, or lesions    Lymph nodes:  No palpable cervical lymphadenopathy        Lab Results:   No visits with results within 1 Day(s) from this visit  Latest known visit with results is:   No results displayed because visit has over 200 results  Radiology Results:   US right upper quadrant    Result Date: 4/17/2023  Narrative: RIGHT UPPER QUADRANT ULTRASOUND INDICATION:     transaminitis, elevated total bilirubin  COMPARISON:  None TECHNIQUE:   Real-time ultrasound of the right upper quadrant was performed with a curvilinear transducer with both volumetric sweeps and still imaging techniques  FINDINGS: PANCREAS:  Visualized portions of the pancreas are within normal limits  AORTA AND IVC:  Visualized portions are normal for patient age  LIVER: Size:  Mildly enlarged     The liver measures 20 0 cm in the midclavicular line  Contour:  Surface contour is smooth  Parenchyma: There is mild to moderate diffuse increased echogenicity with smooth echotexture, without significant beam attenuation or loss of periportal echogenicity  Most consistent with mild hepatic steatosis  No liver mass identified  Limited imaging of the main portal vein shows it to be patent and hepatopetal   BILIARY: No gallbladder findings  No intrahepatic biliary dilatation  CBD measures 6 0 mm  No choledocholithiasis  KIDNEY: Right kidney measures 10 1 x 4 3 x 4 5 cm  Volume 101 5 mL Kidney within normal limits  ASCITES:   None  Impression: Findings suggest fatty infiltration  No gallstones  The common duct is normal in caliber   Workstation performed: [id number]   Answers for HPI/ROS submitted by the patient on 5/14/2023  Chronicity: new  Onset: 1 to 4 weeks ago  Onset quality: gradual  Frequency: daily  Episode duration: 1 Hours  Progression since onset: unchanged  Pain location: periumbilical region  Pain - numeric: 2/10  Pain quality: dull, a sensation of fullness  Radiates to: LLQ, RLQ, back  anorexia: Yes  belching: Yes  headaches: Yes  Aggravated by: certain positions  Relieved by: being still, liquids, movement  Diagnostic workup: ultrasound

## 2023-06-13 ENCOUNTER — HOSPITAL ENCOUNTER (OUTPATIENT)
Dept: ULTRASOUND IMAGING | Facility: HOSPITAL | Age: 58
Discharge: HOME | End: 2023-06-13
Payer: COMMERCIAL

## 2023-06-13 DIAGNOSIS — K76.0 FATTY LIVER: ICD-10-CM

## 2023-06-13 DIAGNOSIS — R79.89 ELEVATED LFTS: ICD-10-CM

## 2023-06-13 PROCEDURE — 76981 USE PARENCHYMA: CPT

## 2023-06-17 PROBLEM — R50.9 FEVER: Status: RESOLVED | Noted: 2023-04-13 | Resolved: 2023-06-17

## 2023-06-29 ENCOUNTER — OFFICE VISIT (OUTPATIENT)
Dept: HEMATOLOGY ONCOLOGY | Facility: CLINIC | Age: 58
End: 2023-06-29
Payer: COMMERCIAL

## 2023-06-29 VITALS
HEIGHT: 66 IN | TEMPERATURE: 97 F | WEIGHT: 192 LBS | RESPIRATION RATE: 16 BRPM | OXYGEN SATURATION: 98 % | BODY MASS INDEX: 30.86 KG/M2 | SYSTOLIC BLOOD PRESSURE: 115 MMHG | DIASTOLIC BLOOD PRESSURE: 80 MMHG | HEART RATE: 56 BPM

## 2023-06-29 DIAGNOSIS — D72.820 LYMPHOCYTOSIS: Primary | ICD-10-CM

## 2023-06-29 DIAGNOSIS — R50.9 FEVER, UNSPECIFIED FEVER CAUSE: ICD-10-CM

## 2023-06-29 DIAGNOSIS — R16.1 SPLENOMEGALY: ICD-10-CM

## 2023-06-29 PROCEDURE — 99213 OFFICE O/P EST LOW 20 MIN: CPT | Performed by: PHYSICIAN ASSISTANT

## 2023-06-29 NOTE — PROGRESS NOTES
[id number] [facility] - Hematology & Medical Oncology  Outpatient Visit Encounter Note      Forrest Vela 56 y.o. female [id number] Date:  6/29/2023    SUBJECTIVE      Forrest Vela is a 56 y.o. here for new consultation with me today. The patient is here for hospital follow-up for which she was initially evaluated by hematology oncology on 4/17/2023 patient reported for this admission for fever, malaise, more recent history of UTI. Seen for fever unknown origin with abnormal CMV and EBV serologies with ongoing work-up per infectious disease, lymphocytosis and eventually resolved with no evidence of blasts reported. Peripheral flow cytometry was ordered at this time. Patient also had normal hemoglobin on admission which down trended questioning dilutional component but reticulocyte count, B12, folate, MMA, homocysteine, direct bili, soluble transferrin receptor were ordered as elevated ferritin may have been from reactive changes at that time. If this was persistent in outpatient setting hemochromatosis studies were recommended. Patient also had transaminitis, question secondary to fever of unknown origin with abnormal CMV and EBV etiologies. She stated that she developed a rash on upper extremities for which rickettsial serology is pending. 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 Infectious disease no prior to discharge states EBV and CMV both test abnormal with differential including these viral illnesses or tick related/rickettsial illness at the time. Parasite smears were negative, HIV testing negative, Lyme testing negative. Family history paternal grandmother with lung cancer diagnosed in her 46s was a smoker, paternal aunt had breast cancer diagnosed in the 35s. EtOH use drinks wine socially, no history of smoking or current smoking, this admission patient denies any herbal supplements or frequent Tylenol use.     Imaging from admission:  CT abdomen pelvis w/contrast: mild splenomegaly, no acute abdominopelvic process, minimal sigmoid diverticulosis   VAS duplex unilateral, limited: no acute DVT, superficial thrombophlebitis   XR Chest pa and lateral: Subtle opacity in the medial left base on the frontal projection, while this could be due to atelectasis, pneumonia is not excluded in the appropriate clinical setting    Interval History: In review of the chart and talking with the patient, patient denies fevers since leaving hosptial. Unintentional weight loss, night sweats, lymphadenopathy. No new rashes. No HA cov, cp, sob, n/v/d/c, blood urine or stool. No tick bites or insect bites since. Has colonoscopy for beginning of August.     I have reviewed the relevant past medical, surgical, social and family history. I have also reviewed allergies and medications for this patient. Review of Systems  Review of Systems   All other systems reviewed and are negative. OBJECTIVE     Physical Exam  Vitals:    06/29/23 1508   BP: 115/80   BP Location: Left arm   Patient Position: Sitting   Cuff Size: Adult   Pulse: 56   Resp: 16   Temp: (!) 97 °F (36.1 °C)   SpO2: 98%   Weight: 87.1 kg (192 lb)   Height: 5' 6" (1.676 m)       Physical Exam  Vitals reviewed. Constitutional:       Appearance: Normal appearance. She is not ill-appearing. Cardiovascular:      Rate and Rhythm: Normal rate and regular rhythm. Heart sounds: Normal heart sounds. Pulmonary:      Effort: Pulmonary effort is normal. No respiratory distress. Chest:      Chest wall: No tenderness. Abdominal:      General: Bowel sounds are normal. There is no distension. Palpations: Abdomen is soft. Musculoskeletal:      Right lower leg: No edema. Left lower leg: No edema. Lymphadenopathy:      Cervical: No cervical adenopathy. Skin:     General: Skin is warm and dry.    Neurological:      Mental Status: She is alert and oriented to person, place, and time. Psychiatric:         Mood and Affect: Mood normal.         Behavior: Behavior normal.        Imaging  Relevant imaging reviewed in chart    6/13/2023  IMPRESSION:     Metavir score: F0 - F1, Absent or Mild Fibrosis     According to the updated SRU consensus statement, a liver stiffness of 5.47 kPa, which in the absence of other known clinical signs*, rules out compensated advanced chronic liver disease (cACLD). If there are known clinical signs, may need further test   for confirmation. https://pubs. rsna.org/doi/10.1148/radiol. 5750232929     Liver steatosis grading:  S2 (33% - 66% steatosis)    Labs  Relevant labs reviewed in chart     CBC AND PLATELET  Order: [id number]   Ref Range & Units 5/12/23  3:42 PM   WBC 4.00 - 10.80 K/uL 7.40    RBC 3.85 - 5.15 M/uL 3.90    HGB 12.0 - 15.3 g/dL 12.0    HCT 36.0 - 45.2 % 36.7    MCV 81.5 - 97.5 fL 94.1    MCH 27.0 - 34.0 pg 30.8    MCHC 32.0 - 36.0 g/dL 32.7    RDW 11.5 - 15.5 % 15.1     - 400 K/uL 200    MPV 6.6 - 11.1 fL 11.1    nRBCs <=0 /100 WBCs 0       Ref Range & Units 5/12/23  3:42 PM   WBC 4.00 - 10.80 K/uL 7.40    Neutrophils % 40.0 - 75.0 % 27.8 Low     Lymphocytes % 18.0 - 42.0 % 61.4 High     Monocytes % 1.0 - 11.0 % 6.8    Eosinophils % 0.0 - 6.0 % 3.0    Basophils % 0.0 - 2.0 % 0.9    Immature Granulocytes % 0.0 - 2.0 % 0.1    Absolute Neutrophils 1.80 - 7.70 K/uL 2.06    Absolute Lymphocytes 1.00 - 4.80 K/ul 4.54    Absolute Monocytes 0.00 - 1.10 K/uL 0.50    Absolute Eosinophils 0.00 - 0.70 K/uL 0.22    Absolute Basophils 0.00 - 0.20 K/uL 0.07    Absolute Immature Granulocytes 0.00 - 0.20 K/uL 0.01       Ref Range & Units 5/12/23  3:42 PM   Hepatitis B Virus DNA IU/mL <10    Comment: HBV DNA Not Detected   Hepatitus B Virus DNA Log IU/mL <1.00    Comment: HBV DNA Not Detected        4/17/2023:  Peripheral flow cytometry no abnormal lymphoid or myeloid populations, no circulating blasts.   Homocystine 9.1    B12 547  Folate 12.9  Soluble transferrin receptor 22.9  Ferritin 947      4/15/2023  Ferritin 1125  Rapid HIV nonreactive  Chronic hepatitis panel mostly nonreactive however hep B core total antibody reactive  ASSESSMENT & PLAN      Diagnosis ICD-10-CM Associated Orders   1. Lymphocytosis  D72.820 CBC and differential     Peripheral Smear     LD,Blood     Comprehensive metabolic panel      2. Splenomegaly  R16.1 CBC and differential     Peripheral Smear     LD,Blood     Comprehensive metabolic panel      3. Fever, unspecified fever cause  R50.9             56 y.o. female presenting for hospital follow up regarding lymphocytosis, peripheral flow cytometry negative and correction of total WBC and absolute lymph count still with perisistent relevant lymphocytosis. · Discussion  · Discussed likely secondary to renee barr virus, will repeat labs at patient's earliest convenience and determine need for follow up or further workup pending repeat labs. She is asymptomatic, doing well overall. · Plan/Labs  · As above    Follow Up  • To be determined pending lab results       All questions were answered to the patient's satisfaction during this encounter. They appreciated and thanked me for spending time with them. The patient knows the contact information for our office and know to reach out for any relevant concerns related to this encounter. For all other listed problems and medical diagnosis in his chart - they are managed by PCP and/or other specialists, which patient acknowledges.     Hematology & Medical Oncology

## 2023-08-03 ENCOUNTER — ANESTHESIA EVENT (OUTPATIENT)
Dept: GASTROENTEROLOGY | Facility: HOSPITAL | Age: 58
End: 2023-08-03

## 2023-08-03 ENCOUNTER — HOSPITAL ENCOUNTER (OUTPATIENT)
Dept: GASTROENTEROLOGY | Facility: HOSPITAL | Age: 58
Setting detail: OUTPATIENT SURGERY
End: 2023-08-03
Payer: COMMERCIAL

## 2023-08-03 ENCOUNTER — ANESTHESIA (OUTPATIENT)
Dept: GASTROENTEROLOGY | Facility: HOSPITAL | Age: 58
End: 2023-08-03

## 2023-08-03 VITALS
DIASTOLIC BLOOD PRESSURE: 68 MMHG | SYSTOLIC BLOOD PRESSURE: 131 MMHG | HEIGHT: 66 IN | HEART RATE: 53 BPM | WEIGHT: 192.46 LBS | BODY MASS INDEX: 30.93 KG/M2 | OXYGEN SATURATION: 97 % | TEMPERATURE: 97.3 F | RESPIRATION RATE: 15 BRPM

## 2023-08-03 DIAGNOSIS — D64.9 ANEMIA, UNSPECIFIED TYPE: ICD-10-CM

## 2023-08-03 DIAGNOSIS — Z12.11 SCREENING FOR COLON CANCER: ICD-10-CM

## 2023-08-03 PROCEDURE — 45380 COLONOSCOPY AND BIOPSY: CPT | Performed by: INTERNAL MEDICINE

## 2023-08-03 PROCEDURE — 88305 TISSUE EXAM BY PATHOLOGIST: CPT | Performed by: PATHOLOGY

## 2023-08-03 RX ORDER — SODIUM CHLORIDE, SODIUM LACTATE, POTASSIUM CHLORIDE, CALCIUM CHLORIDE 600; 310; 30; 20 MG/100ML; MG/100ML; MG/100ML; MG/100ML
INJECTION, SOLUTION INTRAVENOUS CONTINUOUS PRN
Status: DISCONTINUED | OUTPATIENT
Start: 2023-08-03 | End: 2023-08-03

## 2023-08-03 RX ORDER — LIDOCAINE HYDROCHLORIDE 20 MG/ML
INJECTION, SOLUTION EPIDURAL; INFILTRATION; INTRACAUDAL; PERINEURAL AS NEEDED
Status: DISCONTINUED | OUTPATIENT
Start: 2023-08-03 | End: 2023-08-03

## 2023-08-03 RX ORDER — ONDANSETRON 2 MG/ML
4 INJECTION INTRAMUSCULAR; INTRAVENOUS ONCE AS NEEDED
Status: CANCELLED | OUTPATIENT
Start: 2023-08-03

## 2023-08-03 RX ORDER — PROPOFOL 10 MG/ML
INJECTION, EMULSION INTRAVENOUS AS NEEDED
Status: DISCONTINUED | OUTPATIENT
Start: 2023-08-03 | End: 2023-08-03

## 2023-08-03 RX ADMIN — PROPOFOL 35 MG: 10 INJECTION, EMULSION INTRAVENOUS at 09:10

## 2023-08-03 RX ADMIN — PROPOFOL 65 MG: 10 INJECTION, EMULSION INTRAVENOUS at 09:07

## 2023-08-03 RX ADMIN — PROPOFOL 35 MG: 10 INJECTION, EMULSION INTRAVENOUS at 09:13

## 2023-08-03 RX ADMIN — PROPOFOL 65 MG: 10 INJECTION, EMULSION INTRAVENOUS at 09:06

## 2023-08-03 RX ADMIN — SODIUM CHLORIDE, SODIUM LACTATE, POTASSIUM CHLORIDE, AND CALCIUM CHLORIDE: .6; .31; .03; .02 INJECTION, SOLUTION INTRAVENOUS at 07:45

## 2023-08-03 RX ADMIN — LIDOCAINE HYDROCHLORIDE 100 MG: 20 INJECTION, SOLUTION EPIDURAL; INFILTRATION; INTRACAUDAL; PERINEURAL at 09:05

## 2023-08-03 NOTE — ANESTHESIA POSTPROCEDURE EVALUATION
Post-Op Assessment Note    CV Status:  Stable  Pain Score: 0    Pain management: adequate     Mental Status:  Alert and awake   Hydration Status:  Euvolemic   PONV Controlled:  Controlled   Airway Patency:  Patent      Post Op Vitals Reviewed: Yes      Staff: CRNA         No notable events documented.     /55 (08/03/23 0921)    Temp (!) 97.3 °F (36.3 °C) (08/03/23 0921)    Pulse 65 (08/03/23 0921)   Resp 16 (08/03/23 0921)    SpO2 99 % (08/03/23 0921)

## 2023-08-03 NOTE — H&P
History and Physical - [facility]  Mckenna Hull 56 y.o. female MRN: [id number]      HPI: Mckenna Hull is a 56y.o. year old female who presents for evaluation of anemia      REVIEW OF SYSTEMS: Per the HPI, and otherwise unremarkable. Historical Information   Past Medical History:   Diagnosis Date   • Anemia    • Elevated liver enzymes    • Roby Barr virus infection      History reviewed. No pertinent surgical history. Social History   Social History     Substance and Sexual Activity   Alcohol Use Yes    Comment: occational      Social History     Substance and Sexual Activity   Drug Use No     Social History     Tobacco Use   Smoking Status Never   Smokeless Tobacco Not on file     History reviewed. No pertinent family history. Meds/Allergies     (Not in a hospital admission)      Allergies   Allergen Reactions   • Sulfa Antibiotics Other (See Comments)     Drops WBC        Objective     Blood pressure 141/80, pulse 69, temperature (!) 68 °F (20 °C), temperature source Temporal, resp. rate (!) 11, height 5' 6" (1.676 m), weight 87.3 kg (192 lb 7.4 oz), SpO2 99 %. PHYSICAL EXAM    Gen: NAD  CV: RRR  CHEST: Clear  ABD: soft, NT/ND  EXT: no edema      ASSESSMENT/PLAN:  This is a 56y.o. year old female here for colonoscopy, and she is stable and optimized for her procedure.

## 2023-08-08 PROCEDURE — 88305 TISSUE EXAM BY PATHOLOGIST: CPT | Performed by: PATHOLOGY

## 2025-06-09 NOTE — ASSESSMENT & PLAN NOTE
· Mildly elevated AST and ALT  · Likely secondary to viral or parasite process  · Monitor liver enzymes Yes - the patient is able to be screened